# Patient Record
Sex: MALE | Race: WHITE | NOT HISPANIC OR LATINO | URBAN - METROPOLITAN AREA
[De-identification: names, ages, dates, MRNs, and addresses within clinical notes are randomized per-mention and may not be internally consistent; named-entity substitution may affect disease eponyms.]

---

## 2012-10-03 LAB
CREAT SERPL-MCNC: 2.74 MG/DL (ref 0.72–1.25)
GLUCOSE BLD-MCNC: 95 MG/DL (ref 65–100)

## 2012-11-20 LAB
CREAT SERPL-MCNC: 2.93 MG/DL (ref 0.72–1.25)
GLUCOSE BLD-MCNC: 108 MG/DL (ref 65–100)

## 2013-01-08 LAB
CREAT SERPL-MCNC: 2.72 MG/DL (ref 0.72–1.25)
GLUCOSE BLD-MCNC: 96 MG/DL (ref 65–100)

## 2013-03-27 LAB
CREAT SERPL-MCNC: 2.81 MG/DL (ref 0.72–1.25)
GLUCOSE BLD-MCNC: 95 MG/DL (ref 65–100)

## 2015-04-01 LAB
CREAT SERPL-MCNC: 3.51 MG/DL (ref 0.72–1.25)
GLUCOSE BLD-MCNC: 102 MG/DL (ref 65–100)

## 2017-03-30 ENCOUNTER — OFFICE VISIT - RIVER FALLS (OUTPATIENT)
Dept: FAMILY MEDICINE | Facility: CLINIC | Age: 67
End: 2017-03-30

## 2017-04-06 ENCOUNTER — OFFICE VISIT - RIVER FALLS (OUTPATIENT)
Dept: FAMILY MEDICINE | Facility: CLINIC | Age: 67
End: 2017-04-06

## 2017-05-17 ENCOUNTER — COMMUNICATION - RIVER FALLS (OUTPATIENT)
Dept: FAMILY MEDICINE | Facility: CLINIC | Age: 67
End: 2017-05-17

## 2017-05-17 ENCOUNTER — OFFICE VISIT - RIVER FALLS (OUTPATIENT)
Dept: FAMILY MEDICINE | Facility: CLINIC | Age: 67
End: 2017-05-17

## 2017-10-13 ENCOUNTER — OFFICE VISIT - RIVER FALLS (OUTPATIENT)
Dept: FAMILY MEDICINE | Facility: CLINIC | Age: 67
End: 2017-10-13

## 2017-10-16 LAB
CREAT SERPL-MCNC: 2.59 MG/DL (ref 0.7–1.25)
GLUCOSE BLD-MCNC: 94 MG/DL (ref 65–99)

## 2018-03-21 ENCOUNTER — AMBULATORY - RIVER FALLS (OUTPATIENT)
Dept: FAMILY MEDICINE | Facility: CLINIC | Age: 68
End: 2018-03-21

## 2018-03-23 ENCOUNTER — AMBULATORY - RIVER FALLS (OUTPATIENT)
Dept: FAMILY MEDICINE | Facility: CLINIC | Age: 68
End: 2018-03-23

## 2018-03-26 LAB
CHOLEST SERPL-MCNC: 155 MG/DL
CHOLEST/HDLC SERPL: 3.4 {RATIO}
CREAT SERPL-MCNC: 2.83 MG/DL (ref 0.7–1.25)
GLUCOSE BLD-MCNC: 89 MG/DL (ref 65–99)
HDLC SERPL-MCNC: 46 MG/DL
LDLC SERPL CALC-MCNC: 87 MG/DL
NONHDLC SERPL-MCNC: 109 MG/DL
TRIGL SERPL-MCNC: 123 MG/DL

## 2018-03-30 ENCOUNTER — OFFICE VISIT - RIVER FALLS (OUTPATIENT)
Dept: FAMILY MEDICINE | Facility: CLINIC | Age: 68
End: 2018-03-30

## 2018-08-09 ENCOUNTER — COMMUNICATION - RIVER FALLS (OUTPATIENT)
Dept: FAMILY MEDICINE | Facility: CLINIC | Age: 68
End: 2018-08-09

## 2018-08-09 ENCOUNTER — OFFICE VISIT - RIVER FALLS (OUTPATIENT)
Dept: FAMILY MEDICINE | Facility: CLINIC | Age: 68
End: 2018-08-09

## 2018-08-09 LAB
CREAT SERPL-MCNC: 2.99 MG/DL (ref 0.72–1.25)
GLUCOSE BLD-MCNC: 103 MG/DL (ref 65–100)

## 2018-12-14 ENCOUNTER — AMBULATORY - RIVER FALLS (OUTPATIENT)
Dept: FAMILY MEDICINE | Facility: CLINIC | Age: 68
End: 2018-12-14

## 2018-12-14 ENCOUNTER — OFFICE VISIT - RIVER FALLS (OUTPATIENT)
Dept: FAMILY MEDICINE | Facility: CLINIC | Age: 68
End: 2018-12-14

## 2018-12-17 LAB
CREAT SERPL-MCNC: 2.52 MG/DL (ref 0.7–1.25)
GLUCOSE BLD-MCNC: 88 MG/DL (ref 65–99)

## 2019-04-30 ENCOUNTER — OFFICE VISIT - RIVER FALLS (OUTPATIENT)
Dept: FAMILY MEDICINE | Facility: CLINIC | Age: 69
End: 2019-04-30

## 2019-04-30 ENCOUNTER — AMBULATORY - RIVER FALLS (OUTPATIENT)
Dept: FAMILY MEDICINE | Facility: CLINIC | Age: 69
End: 2019-04-30

## 2019-05-01 LAB
ALBUMIN SERPL-MCNC: 4.2 GM/DL (ref 3.6–5.1)
BUN SERPL-MCNC: 51 MG/DL (ref 7–25)
BUN/CREAT RATIO - HISTORICAL: 17 (ref 6–22)
CALCIUM SERPL-MCNC: 9.4 MG/DL (ref 8.6–10.3)
CALCIUM SERPL-MCNC: 9.4 MG/DL (ref 8.6–10.3)
CHLORIDE BLD-SCNC: 105 MMOL/L (ref 98–110)
CO2 SERPL-SCNC: 23 MMOL/L (ref 20–32)
CREAT SERPL-MCNC: 2.96 MG/DL (ref 0.7–1.25)
CREAT UR-MCNC: 57 MG/DL (ref 20–320)
EGFRCR SERPLBLD CKD-EPI 2021: 21 ML/MIN/1.73M2
GLUCOSE BLD-MCNC: 88 MG/DL (ref 65–99)
HGB BLD-MCNC: 12.5 GM/DL (ref 13.2–17.1)
PHOSPHATE SERPL-MCNC: 4.4 MG/DL (ref 2.1–4.3)
POTASSIUM BLD-SCNC: 4.2 MMOL/L (ref 3.5–5.3)
PROT UR-MCNC: 50 MG/DL (ref 5–25)
PROT/CREAT 24H UR: 877 MG/G{CREAT} (ref 22–128)
PTH-INTACT SERPL-MCNC: 120 PG/ML (ref 14–64)
SODIUM SERPL-SCNC: 140 MMOL/L (ref 135–146)

## 2019-05-02 ENCOUNTER — COMMUNICATION - RIVER FALLS (OUTPATIENT)
Dept: FAMILY MEDICINE | Facility: CLINIC | Age: 69
End: 2019-05-02

## 2019-11-19 ENCOUNTER — OFFICE VISIT - RIVER FALLS (OUTPATIENT)
Dept: FAMILY MEDICINE | Facility: CLINIC | Age: 69
End: 2019-11-19

## 2019-11-19 ENCOUNTER — AMBULATORY - RIVER FALLS (OUTPATIENT)
Dept: FAMILY MEDICINE | Facility: CLINIC | Age: 69
End: 2019-11-19

## 2019-11-20 LAB
ALBUMIN SERPL-MCNC: 4.5 GM/DL (ref 3.6–5.1)
BUN SERPL-MCNC: 51 MG/DL (ref 7–25)
BUN/CREAT RATIO - HISTORICAL: 16 (ref 6–22)
CALCIUM SERPL-MCNC: 9.6 MG/DL (ref 8.6–10.3)
CALCIUM SERPL-MCNC: 9.6 MG/DL (ref 8.6–10.3)
CHLORIDE BLD-SCNC: 102 MMOL/L (ref 98–110)
CO2 SERPL-SCNC: 24 MMOL/L (ref 20–32)
CREAT SERPL-MCNC: 3.25 MG/DL (ref 0.7–1.25)
CREAT UR-MCNC: 94 MG/DL (ref 20–320)
EGFRCR SERPLBLD CKD-EPI 2021: 18 ML/MIN/1.73M2
GLUCOSE BLD-MCNC: 95 MG/DL (ref 65–99)
HGB BLD-MCNC: 12.9 GM/DL (ref 13.2–17.1)
MICROALBUMIN UR-MCNC: 9.1 MG/DL
MICROALBUMIN/CREAT UR: 97 MG/G{CREAT}
PHOSPHATE SERPL-MCNC: 4.1 MG/DL (ref 2.1–4.3)
POTASSIUM BLD-SCNC: 3.8 MMOL/L (ref 3.5–5.3)
PTH-INTACT SERPL-MCNC: 133 PG/ML (ref 14–64)
SODIUM SERPL-SCNC: 139 MMOL/L (ref 135–146)

## 2019-11-21 ENCOUNTER — COMMUNICATION - RIVER FALLS (OUTPATIENT)
Dept: FAMILY MEDICINE | Facility: CLINIC | Age: 69
End: 2019-11-21

## 2020-01-01 ENCOUNTER — OFFICE VISIT - RIVER FALLS (OUTPATIENT)
Dept: FAMILY MEDICINE | Facility: CLINIC | Age: 70
End: 2020-01-01

## 2020-01-01 ENCOUNTER — COMMUNICATION - RIVER FALLS (OUTPATIENT)
Dept: FAMILY MEDICINE | Facility: CLINIC | Age: 70
End: 2020-01-01

## 2020-01-01 LAB
ALBUMIN SERPL-MCNC: 3.7 G/DL
ANION GAP SERPL CALCULATED.3IONS-SCNC: 9 MEQ/L
B-TYPE NATRIURETIC PEPTIDE: 235 PG/ML (ref 0–100)
BASOPHILS # BLD MANUAL: 0 CELLS/UL
BASOPHILS NFR BLD AUTO: 2.7 %
BUN SERPL-MCNC: 34 MG/DL
BUN SERPL-MCNC: 36 MG/DL
BUN SERPL-MCNC: 36 MG/DL
BUN SERPL-MCNC: 42 MG/DL
BUN/CREAT RATIO - HISTORICAL: 12
BUN/CREAT RATIO - HISTORICAL: 3.09
BUN/CREAT RATIO - HISTORICAL: 3.39
CALCIUM SERPL-MCNC: 7.8 MEQ/DL
CALCIUM SERPL-MCNC: 7.8 MEQ/DL
CALCIUM SERPL-MCNC: 8.2 MEQ/DL
CALCIUM SERPL-MCNC: 8.9 MEQ/DL
CHLORIDE BLD-SCNC: 107 MEQ/L
CHLORIDE BLD-SCNC: 117 MEQ/L
CHLORIDE BLD-SCNC: 117 MEQ/L
CHLORIDE BLD-SCNC: 118 MEQ/L
CO2 SERPL-SCNC: 19 MEQ/L
CO2 SERPL-SCNC: 23 MEQ/L
CREAT SERPL-MCNC: 12 MG/DL
CREAT SERPL-MCNC: 2.79 MG/DL
CREAT SERPL-MCNC: 3.09 MG/DL
EOSINOPHIL # BLD MANUAL: 0.2 CELLS/UL
EOSINOPHIL NFR BLD AUTO: 0.3 %
ERYTHROCYTE [DISTWIDTH] IN BLOOD BY AUTOMATED COUNT: 14.6 %
GFR ESTIMATE EXT - HISTORICAL: 20 ML/MIN
GLUCOSE BLD-MCNC: 103 MG/DL
GLUCOSE BLD-MCNC: 89 MG/DL
GLUCOSE BLD-MCNC: 90 MG/DL
GLUCOSE BLD-MCNC: 90 MG/DL
HCT VFR BLD AUTO: 28.4 %
HCT VFR BLD AUTO: 28.4 %
HGB BLD-MCNC: 9.5 G/DL
HGB BLD-MCNC: 9.5 G/DL
INR BLD: 1.9
INR BLD: 2.1
INR BLD: 2.2
INR PPP: 1.1
INR PPP: 1.1
INR PPP: 2.8
INR PPP: 3.2
INR PPP: 3.3
INR PPP: 7.6
LYMPHOCYTES # BLD MANUAL: 0.8 CELLS/UL
LYMPHOCYTES NFR BLD AUTO: 10 %
MCH RBC QN AUTO: 29.1 PG
MCHC RBC AUTO-ENTMCNC: 33.5 GM/DL
MCV RBC AUTO: 87 FL
MONOCYTES # BLD MANUAL: 0.9 CELLS/UL
MONOCYTES NFR BLD AUTO: 11.4 %
NEUTROPHILS # BLD MANUAL: 5.7 CELLS/UL
NEUTROPHILS NFR BLD AUTO: 75.6 %
PHOSPHATE SERPL-MCNC: 4.4 MG/DL
PLATELET # BLD AUTO: 134 X10
PLATELET # BLD AUTO: 134 X10
PMV BLD: 10 FL
POTASSIUM BLD-SCNC: 3 MEQ/L
POTASSIUM BLD-SCNC: 3 MEQ/L
POTASSIUM BLD-SCNC: 3.3 MEQ/L
POTASSIUM BLD-SCNC: 3.6 MEQ/L
PROTHROMBIN TIME: 13.9 S
PROTHROMBIN TIME: 13.9 S
RBC # BLD AUTO: 3.27 X10
RBC # BLD AUTO: 3.27 X10
SODIUM SERPL-SCNC: 140 MEQ/L
SODIUM SERPL-SCNC: 145 MEQ/L
SODIUM SERPL-SCNC: 145 MEQ/L
SODIUM SERPL-SCNC: 146 MEQ/L
TSH SERPL DL<=0.005 MIU/L-ACNC: 4.45 MIU/L
TSH SERPL DL<=0.005 MIU/L-ACNC: 4.45 MIU/L
WBC # BLD AUTO: 7.5 X10
WBC # BLD AUTO: 7.5 X10

## 2022-02-11 VITALS
DIASTOLIC BLOOD PRESSURE: 60 MMHG | WEIGHT: 167 LBS | HEART RATE: 63 BPM | OXYGEN SATURATION: 96 % | SYSTOLIC BLOOD PRESSURE: 120 MMHG | TEMPERATURE: 97.4 F

## 2022-02-12 VITALS
TEMPERATURE: 97.6 F | OXYGEN SATURATION: 97 % | SYSTOLIC BLOOD PRESSURE: 140 MMHG | DIASTOLIC BLOOD PRESSURE: 72 MMHG | HEART RATE: 67 BPM

## 2022-02-12 VITALS
HEART RATE: 61 BPM | DIASTOLIC BLOOD PRESSURE: 76 MMHG | SYSTOLIC BLOOD PRESSURE: 130 MMHG | OXYGEN SATURATION: 99 % | TEMPERATURE: 97.1 F

## 2022-02-12 VITALS — DIASTOLIC BLOOD PRESSURE: 88 MMHG | HEART RATE: 59 BPM | TEMPERATURE: 97.1 F | SYSTOLIC BLOOD PRESSURE: 153 MMHG

## 2022-02-12 VITALS
WEIGHT: 196.8 LBS | SYSTOLIC BLOOD PRESSURE: 150 MMHG | DIASTOLIC BLOOD PRESSURE: 83 MMHG | HEART RATE: 71 BPM | TEMPERATURE: 97 F

## 2022-02-12 VITALS
WEIGHT: 183 LBS | TEMPERATURE: 96.9 F | SYSTOLIC BLOOD PRESSURE: 153 MMHG | HEART RATE: 67 BPM | DIASTOLIC BLOOD PRESSURE: 88 MMHG

## 2022-02-12 VITALS — HEART RATE: 72 BPM | TEMPERATURE: 96.2 F | SYSTOLIC BLOOD PRESSURE: 122 MMHG | DIASTOLIC BLOOD PRESSURE: 76 MMHG

## 2022-02-12 VITALS
TEMPERATURE: 97.3 F | HEART RATE: 61 BPM | WEIGHT: 196 LBS | SYSTOLIC BLOOD PRESSURE: 137 MMHG | DIASTOLIC BLOOD PRESSURE: 83 MMHG | OXYGEN SATURATION: 97 %

## 2022-02-15 NOTE — NURSING NOTE
Anticoagulation Therapy Management Entered On:  8/14/2020 3:02 PM CDT    Performed On:  8/14/2020 3:01 PM CDT by Chantal Zarate RN               Anticoagulation Visit Assessment   Anticoagulation Indication :   Atrial fibrillation   Anticoagulation Medication Verified :   Yes   Chantal Zarate RN - 8/14/2020 3:01 PM CDT   Anticoagulation Patient Assessment Grid   Change in Alcohol Consumption :   No   Change in Diet :   No   Change in Medications :   No   Diarrhea :   No   Rectal Bleeding :   No   Signs of Clotting :   No   Signs of Warfarin Intolerance :   No   Unusual Bleeding, Bruising :   No   Upcoming Procedures :   No   Vomiting :   No   Chantal Zarate RN - 8/14/2020 3:01 PM CDT   Patient on Warfarin :   Yes   Chantal Zarate RN - 8/14/2020 3:01 PM CDT   Warfarin   Anticoagulant INR Goal Lower :   2    Anticoagulant INR Goal Upper :   3    INR Home Monitoring Result :   2.5    Sunday :   0 mg   Monday :   0 mg   Tuesday :   2.5 mg   Wednesday :   2.5 mg   Thursday :   2.5 mg   Friday :   2.5 mg   Saturday :   2.5 mg   Total Dose :   12.5 mg   Warfarin Pt Reported Previous Week Dose :    Sun Mon Tues Wed Thurs Fri Sat Weekly Total Dose   Week 1                   Week 2                   Week 3                   Week 4                         Patient is taking single or multiple strength tablet(s) :   Single strength tab(s)   One Tab Strength :   5 mg tab   Sunday :   2.5 mg   Monday :   0 mg   Tuesday :   0 mg   Wednesday :   0 mg   Thursday :   0 mg   Friday :   0 mg   Saturday :   0 mg   Week 1 Total Dose :   2.5 mg   Sunday :   0.5 tab(s)   Monday :   0 tab(s)   Tuesday :   0 tab(s)   Wednesday :   0 tab(s)   Thursday :   0 tab(s)   Friday :   0 tab(s)   Saturday :   0 tab(s)   Patient Instructions :   Fax received from [facility] with INR = 2.5; per protocol continue warfarin 2.5 mg daily; recheck INR 8/17/2020. Faxed to [facility].     Chantal Zarate RN - 8/14/2020 3:01 PM CDT

## 2022-02-15 NOTE — NURSING NOTE
Anticoagulation Therapy Management Entered On:  12/30/2020 8:34 AM CST    Performed On:  12/30/2020 8:28 AM CST by Geri Valdez RN               Anticoagulation Visit Assessment   Contributing Factors :   Other:  INR 2.4 on 12/29/20 per [facility]. Covid hospitalization Discharge 12/28/20 [facility]. Holding warfarin until INR less than 2 then to start lovenox per discharge [facility]   Geri Valdez RN - 12/30/2020 8:34 AM CST   Anticoagulation Indication :   Atrial fibrillation   Anticoagulant Duration :   Undetermined   Geri Valdez RN - 12/30/2020 8:28 AM CST   Geri Valdez RN - 12/30/2020 8:28 AM CST   Anticoagulation Patient Assessment Grid   Change in Alcohol Consumption :   No   Geri Valdez RN - 12/30/2020 8:34 AM CST     Change in Diet :   No   Change in Medications :   Yes   (Comment: [facility] discharge holding warfarin multiple medications discontinued, hospice referral,, [Geri Valdez RN - 12/30/2020 8:28 AM CST] )   Diarrhea :   No   Rectal Bleeding :   No   Signs of Clotting :   No   Signs of Warfarin Intolerance :   No   Unusual Bleeding, Bruising :   No   Upcoming Procedures :   No   Vomiting :   No   Patient on Other Anticoagulant :   No   Geri Valdez RN - 12/30/2020 8:28 AM CST   Medication History   Medication List   (As Of: 12/30/2020 8:34:01 AM CST)   Prescription/Discharge Order    amLODIPine  :   amLODIPine ; Status:   Prescribed ; Ordered As Mnemonic:   amLODIPine 10 mg oral tablet ; Simple Display Line:   10 mg, 1 tab(s), po, daily, 90 tab(s), 3 Refill(s) ; Ordering Provider:   Chuck Anderson MD; Catalog Code:   amLODIPine ; Order Dt/Tm:   11/19/2019 5:39:48 PM CST          furosemide  :   furosemide ; Status:   Prescribed ; Ordered As Mnemonic:   furosemide 40 mg oral tablet ; Simple Display Line:   40 mg, 1 tab(s), Oral, daily, 90 tab(s), 3 Refill(s) ; Ordering Provider:   Chuck Anderson MD; Catalog Code:   furosemide ; Order Dt/Tm:   11/19/2019 5:39:47 PM CST          hydrALAZINE   :   hydrALAZINE ; Status:   Prescribed ; Ordered As Mnemonic:   hydrALAZINE 100 mg oral tablet ; Simple Display Line:   100 mg, 1 tab(s), PO, BID, 270 tab(s), 3 Refill(s) ; Ordering Provider:   Chuck Anderson MD; Catalog Code:   hydrALAZINE ; Order Dt/Tm:   11/19/2019 5:39:48 PM CST          melatonin  :   melatonin ; Status:   Prescribed ; Ordered As Mnemonic:   melatonin 3 mg oral tablet ; Simple Display Line:   3 mg, 1 tab(s), po, qhs, 90 tab(s), 3 Refill(s) ; Ordering Provider:   Chuck Anderson MD; Catalog Code:   melatonin ; Order Dt/Tm:   12/18/2018 12:30:54 PM CST          metoprolol  :   metoprolol ; Status:   Prescribed ; Ordered As Mnemonic:   Metoprolol Succinate  mg oral tablet, extended release ; Simple Display Line:   1 tab(s), Oral, daily, 90 tab(s), 3 Refill(s) ; Ordering Provider:   Chuck Anderson MD; Catalog Code:   metoprolol ; Order Dt/Tm:   11/19/2019 5:39:51 PM CST          Miscellaneous Rx Supply  :   Miscellaneous Rx Supply ; Status:   Prescribed ; Ordered As Mnemonic:   left foot and ankle orthosis ; Simple Display Line:   See Instructions, use as directed, 1 EA ; Ordering Provider:   Salomon Vance MD; Catalog Code:   Miscellaneous Rx Supply ; Order Dt/Tm:   7/24/2013 10:29:40 AM CDT          nitroglycerin  :   nitroglycerin ; Status:   Prescribed ; Ordered As Mnemonic:   nitroglycerin 0.4 mg sublingual tablet ; Simple Display Line:   0.4 mg, 1 tab(s), SL, q5min, PRN: for chest pain, 25 tab(s), 1 Refill(s) ; Ordering Provider:   Chuck Anderson MD; Catalog Code:   nitroglycerin ; Order Dt/Tm:   9/29/2016 7:25:36 PM CDT          tamsulosin  :   tamsulosin ; Status:   Prescribed ; Ordered As Mnemonic:   tamsulosin 0.4 mg oral capsule ; Simple Display Line:   0.4 mg, 1 cap(s), PO, Daily, 90 cap(s), 3 Refill(s) ; Ordering Provider:   Chuck Anderson MD; Catalog Code:   tamsulosin ; Order Dt/Tm:   11/19/2019 5:39:49 PM CST            Home Meds    aspirin  :   aspirin ; Status:   Documented ; Ordered As  Mnemonic:   aspirin 81 mg oral tablet, chewable ; Simple Display Line:   81 mg, 1 tab(s), Oral, daily ; Catalog Code:   aspirin ; Order Dt/Tm:   7/27/2020 9:04:22 AM CDT          magnesium hydroxide  :   magnesium hydroxide ; Status:   Documented ; Ordered As Mnemonic:   Milk of Magnesia ; Simple Display Line:   Oral, hs, PRN: as needed for constipation, 0 Refill(s) ; Catalog Code:   magnesium hydroxide ; Order Dt/Tm:   12/9/2020 11:05:24 AM CST          polyethylene glycol 3350  :   polyethylene glycol 3350 ; Status:   Documented ; Ordered As Mnemonic:   MiraLax oral powder for reconstitution ; Simple Display Line:   17 gm, Oral, daily, PRN: for constipation, 0 Refill(s) ; Catalog Code:   polyethylene glycol 3350 ; Order Dt/Tm:   12/9/2020 11:05:15 AM CST          rosuvastatin  :   rosuvastatin ; Status:   Documented ; Ordered As Mnemonic:   Crestor 5 mg oral tablet ; Simple Display Line:   5 mg, 1 tab(s), Oral, daily, 0 Refill(s) ; Catalog Code:   rosuvastatin ; Order Dt/Tm:   12/9/2020 10:59:16 AM CST          senna  :   senna ; Status:   Documented ; Ordered As Mnemonic:   Senna ; Simple Display Line:   po, hs, PRN: as needed for constipation ; Catalog Code:   senna ; Order Dt/Tm:   10/2/2012 11:15:43 AM CDT          warfarin  :   warfarin ; Status:   Documented ; Ordered As Mnemonic:   warfarin 7.5 mg oral tablet ; Simple Display Line:   See Instructions, alternating 7.5, 5mg QOD ; Catalog Code:   warfarin ; Order Dt/Tm:   7/27/2020 9:04:40 AM CDT

## 2022-02-15 NOTE — NURSING NOTE
Anticoagulation Therapy Management Entered On:  8/21/2020 4:23 PM CDT    Performed On:  8/21/2020 4:17 PM CDT by Geri Valdez RN               Anticoagulation Visit Assessment   Anticoagulation Indication :   Atrial fibrillation   Anticoagulant Duration :   Undetermined   Geri Valdez RN - 8/21/2020 4:17 PM CDT   Anticoagulation Patient Assessment Grid   Change in Alcohol Consumption :   No   Change in Diet :   No   Change in Medications :   No   Diarrhea :   No   Rectal Bleeding :   No   Signs of Clotting :   No   Signs of Warfarin Intolerance :   No   Unusual Bleeding, Bruising :   No   Upcoming Procedures :   No   Vomiting :   No   Geri Valdez RN - 8/21/2020 4:17 PM CDT   Patient on Warfarin :   Yes   Geri Valdez RN - 8/21/2020 4:17 PM CDT   Warfarin   Anticoagulant INR Goal Lower :   2    Anticoagulant INR Goal Upper :   3    Warfarin Pt Reported Previous Week Dose :    Sun Mon Tues Wed Thurs Fri Sat Weekly Total Dose   Week 1                   Week 2                   Week 3                   Week 4                         Patient is taking single or multiple strength tablet(s) :   Single strength tab(s)   One Tab Strength :   5 mg tab   Sunday :   0 mg   Monday :   0 mg   Tuesday :   0 mg   Wednesday :   0 mg   Thursday :   0 mg   Friday :   0 mg   Saturday :   0 mg   Week 1 Total Dose :   0 mg   Sunday :   0 tab(s)   Monday :   0 tab(s)   Tuesday :   0 tab(s)   Wednesday :   0 tab(s)   Thursday :   0 tab(s)   Friday :   0 tab(s)   Saturday :   0 tab(s)   Patient Instructions :   INR = 5.0 per [facility] fax today. Patient is to hold warfarin and recheck INR on 8/24/20 directions faxed to Geri Granados RN - 8/21/2020 4:17 PM CDT

## 2022-02-15 NOTE — PROGRESS NOTES
Patient:   BEHRENS, LARRY L            MRN: [id number]            FIN: [id number]               Age:   67 years     Sex:  Male     :  1950   Associated Diagnoses:   Cerebrovascular Accident (Stroke); HTN (Hypertension); Chronic Kidney Disease (CKD), Stage IV (Severe)   Author:   Chuck Anderson MD      Visit Information      Date of Service: 10/13/2017 03:42 pm  Performing Location: [facility]  Encounter#: [id number]      Primary Care Provider (PCP):  Chuck Anderson MD    NPI# [id number]      Referring Provider:  Chuck Anderson MD, NPI# [id number]   Accompanied by:  Spouse.    Source of history:  Self, Spouse.       Chief Complaint            Additional Information:No additional information recorded during visit.   Chief complaint and symptoms as noted above and confirmed with patient      History of Present Illness   10/14/2013: Mr. Behrens is a 64 yo referred by Dr. Vance  to renal clinic for evaluation of CKD, stage IV.  He has previously beened evaluated by Dr. Dockery via [facility] nephrology group, last seen in .  He has previously undergone extensive renal work-up including renal U/S showing no evidence of macrovascular disease and extensive intraparenchymal disease.  Previous urine immunofization studies negative.  Serological work-up previously negative.  His most recent SCr was 3.3 in August, eGFR ~18mL/min.  He and his wife say they've had some discussion with previous nephrologist about renal replacement therapy, though still unclear about what option is best for them.  Denies any uremic symptoms.  His peripheral edema has been well controlled on metolazone as diuretic monotherapy.  Blood pressure well controlled.  Currently without insurance and working on both coverage options.     10/13/2017: Returns to clinic for f/u of CKD.  Labs reviewed.  No voiced complaints.  No active LUTs.  No bone pains.Presents with 4 day history of unilateral left-sided lower extremity swelling and some  noticed purpuric features.  His wife is concerned about him having cellulitis.  The leg does not hurt.  No recent injury.  No history of venous thromboembolism.  Remote history of stroke with left-sided hemiparesis.         Review of Systems   Constitutional:  No fever, No chills.    Eye   Ear/Nose/Mouth/Throat:  No nasal congestion.    Respiratory:  No shortness of breath, No cough.    Cardiovascular:  No palpitations, No peripheral edema, No syncope.    Gastrointestinal:  No nausea, No vomiting, No abdominal pain.    Genitourinary:  No dysuria, No hematuria.    Hematology/Lymphatics:  Negative except as documented in history of present illness.    Endocrine:  No excessive thirst, No polyuria.    Immunologic:  No recurrent fevers.    Musculoskeletal:  No joint pain, No muscle pain.    Neurologic:  Alert and oriented X4, No numbness, No tingling, No headache.       Health Status   Allergies:    Allergic Reactions (Selected)  Severity Not Documented  Penicillin (No reactions were documented)   Medications:  (Selected)   Prescriptions  Prescribed  Lasix 40 mg oral tablet: 1 tab(s) ( 40 mg ), PO, Daily, # 90 tab(s), 3 Refill(s), Type: Maintenance, Pharmacy: [facility], 1 tab(s) po daily  amLODIPine 10 mg oral tablet: 1 tab(s) ( 10 mg ), PO, Daily, # 90 tab(s), 3 Refill(s), Type: Maintenance, Pharmacy: [facility], 1 tab(s) po daily  hydrALAZINE 50 mg oral tablet: 1 tab(s) ( 50 mg ), po, tid, # 270 tab(s), 3 Refill(s), Type: Maintenance, Pharmacy: [facility], 1 tab(s) po tid  left foot and ankle orthosis: left foot and ankle orthosis, See Instructions, Instructions: use as directed, # 1 EA, 0 Refill(s), Type: Maintenance  melatonin 3 mg oral tablet: 1 tab(s) ( 3 mg ), po, qhs, # 90 tab(s), 3 Refill(s), Type: Maintenance, Pharmacy: [facility], 1 tab(s) po qhs  metoprolol succinate 100 mg oral tablet, extended release: 1 tab(s) ( 100 mg ), PO, Daily, # 90 tab(s), 3 Refill(s), Type: Maintenance,  Pharmacy: [facility], 1 tab(s) po daily  nitroglycerin 0.4 mg sublingual tablet: 1 tab(s) ( 0.4 mg ), SL, q5min, PRN: for chest pain, # 25 tab(s), 1 Refill(s), Type: Maintenance, Pharmacy: [facility], KEEP ON FILE AND PT WILL NOTFY WHEN NEEDED, 1 tab(s) sl q5 min,PRN:for chest pain  simvastatin 20 mg oral tablet: 1 tab(s) ( 20 mg ), po, hs, # 90 tab(s), 3 Refill(s), Type: Maintenance, Pharmacy: [facility], 1 tab(s) po hs  tamsulosin 0.4 mg oral capsule: 1 cap(s) ( 0.4 mg ), PO, Daily, # 90 cap(s), 3 Refill(s), Type: Maintenance, Pharmacy: [facility], 1 cap(s) po daily  Documented Medications  Documented  Senna: po, hs, PRN: as needed for constipation, 0 Refill(s), Type: Maintenance  aspirin 325 mg oral tablet: 1 tab(s) ( 325 mg ), po, daily, 0 Refill(s), Type: Maintenance   Problem list:    All Problems  CKD (chronic kidney disease) stage 4, GFR 15-29 ml/min / SNOMED CT [id number] / Confirmed  Cerebrovascular accident (stroke) / SNOMED CT 092500715 / Confirmed  History of tobacco use / ICD-9-CM V15.82 / Probable  Hypertensive kidney disease with chronic kidney disease stage IV / SNOMED CT 73429074 / Confirmed  Left hemiplegia / SNOMED CT 724072595 / Confirmed  Resolved: *Hospitalized@[facility] - Chest pains  Resolved: *Hospitalized@[facility] - Weakness, generalized  Resolved: Acute on chronic renal failure / SNOMED CT 6732112045  Resolved: No pertinent past medical history / ICD-9-CM V49.9      Histories   Past Medical History:    Active  Hypertensive kidney disease with chronic kidney disease stage IV (30485822)  Cerebrovascular accident (stroke) (824135918)  Left hemiplegia (352482226)  Resolved  *Hospitalized@[facility] - Chest pains: Onset on 4/6/2014 at 64 years.  Resolved on 4/7/2014 at 64 years.  *Hospitalized@[facility] - Weakness, generalized: Onset on 9/25/2012 at 62 years.  Resolved.  No pertinent past medical history (V49.9):  Resolved.  Comments:  9/25/2012 CDT 11:55 AM CDT -  Barry Vuong MD  no regular health care  Acute on chronic renal failure (6699055877):  Resolved.   Family History:    Cancer  Mother  Father     Procedure history:    Appendectomy (208237518).  History of elbow surgery ([id number]).   Social History:        Alcohol Assessment: High Risk            Past      Tobacco Assessment            Past, Cigarettes, 10 per day.  30 year(s).  Total pack years: 50.      Substance Abuse Assessment: Denies Substance Abuse      Employment and Education Assessment            Retired      Home and Environment Assessment            Marital status: .  Spouse/Partner name: Sidra.  0 children.                     Comments:                      09/25/2012 - Barry Vuong MD                     no insurance      Exercise and Physical Activity Assessment            Exercise frequency: 5 times per week.  Exercise type: stationary bike.        Physical Examination   vital signs stable, as noted above   VS/Measurements   General:  Alert and oriented, No acute distress.    Eye:  Extraocular movements are intact.    HENT:  Normocephalic, Tympanic membranes are clear, Oral mucosa is moist.    Neck:  Supple.    Respiratory:  Lungs are clear to auscultation, Respirations are non-labored.    Cardiovascular:  Normal rate, Regular rhythm, No murmur.    Gastrointestinal:  Soft, Non-distended, Normal bowel sounds, No organomegaly.    Genitourinary:  No costovertebral angle tenderness.    Lymphatics:  unilateral left sided edema up to knee; some purpuric/ecchymotic features along posterior/lateral aspect of leg - no cellulitic features.  Non tender; no warmth.    Musculoskeletal:  Normal range of motion, Normal strength, No tenderness, left sided hemiparesis with contractures.    Neurologic:  Alert, Oriented, Normal motor function, No focal deficits.    Cognition and Speech:  Oriented, word apraxia, some dysarthric speech.    Psychiatric:  Appropriate mood & affect.        Review / Management   Results review      Impression and Plan   Diagnosis     Cerebrovascular Accident (Stroke) (NZU94-GK I63.50).     HTN (Hypertension) (QYS46-YP I10).     Chronic Kidney Disease (CKD), Stage IV (Severe) (LGM90-GA N18.4).         .) CKD, stage IV - likely related to microvascular disease in setting of long-standing hypertension, tobaco use.      - baseline SCr of ~2.5-3.0; eGFR ~20mL/min, stable    - CO2 fine    - proteinuria slowly rising     - consider ACEi vs. ARB in the future if proteinuria >0.5g/g    - attended pre-dialysis education classes via [facility] (6/2014). Tentative plans to begin CCPD when time to initiate comes    .) unilateral left sided edema; no cellulitic features on exam   - suspect neurogenic related edema; potential DVT?   - make arrangements for venous duplex study   - assuming no DVT; I think we would benefit from home lymphedema wrapping    - Juan is homebound given related hemiparesis, immobility, and general frailty.  He requires assistance of both wife and another individual to transfer him from car.  I believe he would benefit from home PT/OT assessment    .) Hypertension, controlled   - currently near goal of SBP <140   - continue Toprol XL 100mg daily, amlodipine 10mg, doxazosin 2mg qhs, hydralazine 50mg TID, lasix 40mg daily   .) secondary hyperparathyroidism/ hyperphosphatemia    - phosphorus levels normal without binder therapy    - vitamin D stores replete    - not an active candidate for vitamin D analogues    .) Anemia of chronic renal disease   - Hgb 14.1   - currently not on ESAs   - follow serial Hgb and iron studies as appropriate    .) CVA   - worked with PT/OT   - discontinued baclofen because of sedation   - working with neuro in [place]    .) health maintenance   - declines all forms of age appropriate health screening   - PSA elevated at 8.1 as part of prostatitis work-up; Juan does not want to pursue this any further    - no targeted symptoms  to suggestive advanced spread of disease    RTC q 4 months

## 2022-02-15 NOTE — TELEPHONE ENCOUNTER
---------------------  From: Heidi Fagan RN (INR Pool ( [address]))   To: [facility] Message Pool ([facility]);     Sent: 12/29/2020 2:56:27 PM CST  Subject: INR     Received fax from [facility] with INR result of 2.4 for patient.   Warfarin discontinued and Lovenox to be started once INR < 2.0.    Is Warfarin going to be restarted or is it discontinued indefinitely?  Please advise.---------------------  From: Larissa Rae CMA ([facility] Message Pool ([facility]))   To: Chuck Anderson MD;     Sent: 12/29/2020 2:59:12 PM CST  Subject: FW: INR---------------------  From: Chuck Anderson MD   To: [facility] Message Pool ([facility]); INR Pool ( [address] [place] [address]);     Sent: 12/29/2020 4:02:19 PM CST  Subject: RE: INR     warfarin is being held indefinitely for now.  I want him to start on enoxaparin 40mg SQ daily once INR <2.0.  Not therapeutic dosing---------------------  From: Heidi Fagan RN (INR Pool ( [address]))   To: [facility] Message Pool ([facility]);     Sent: 12/29/2020 4:24:40 PM CST  Subject: RE: INR     Recheck INR 12/31/20?---------------------  From: Larissa Rae CMA ([facility] Message Pool ([address]))   To: INR Pool ( [address] [place] [address]);     Sent: 12/30/2020 7:23:03 AM CST  Subject: RE: INR     Per INR flow sheet repeat INR Thurs - see fax---------------------  From: Shirley Orozco (INR Pool ( [address]))   To: Chuck Anderson MD;     Sent: 1/8/2021 8:30:21 AM CST  Subject: FW: INR     FYI: Pt never returned for recheck of INR    Shirley RN---------------------  From: Chuck Anderson MD   To: [facility] Message Pool ([address]_[place] [facility] [place]);     Sent: 1/8/2021 1:57:13 PM CST  Subject: FW: INR     Can you plan on Juan following up in clinic. Or I should talk more to Sidra about future goals of care and whether he want to be pursuing more palliative care goals.---------------------  From: Larissa Rae CMA ([facility] Message Pool ([address]_[facility]  [place]))   To: Chuck Anderson MD;     Sent: 1/12/2021 9:34:40 AM CST  Subject: RE: INR     Do you want to talk to Sidra leary?---------------------  From: Chuck Anderson MD   To: [facility] Message Pool ([address]_[place] [facility] [place]);     Sent: 1/12/2021 11:54:27 AM CST  Subject: RE: INR     yes, I think that's best.  I'll call her---------------------  From: Chuck Anderson MD   To: [facility] Message Pool ([address]_[facility]); INR Pool ( [address]);     Sent: 1/12/2021 3:22:42 PM CST  Subject: RE: INR     I spoke with Sidra.  Juan's on hospice cares with [facility] through [facility].  No needs for further anticoagulation follow-up---------------------  From: Larissa Rae CMA ([facility] Message Pool ([address]_[facility]))   To: INR Pool ( [address]_[facility]);     Sent: 1/12/2021 3:26:36 PM CST  Subject: FW: INRNoted. Patient deactivated in [employer] program.

## 2022-02-15 NOTE — NURSING NOTE
Anticoagulation Therapy Management Entered On:  8/27/2020 2:30 PM CDT    Performed On:  8/27/2020 2:25 PM CDT by Geri Valdez RN               Anticoagulation Visit Assessment   Anticoagulation Indication :   Atrial fibrillation   Anticoagulant Duration :   Undetermined   Anticoagulation Medication Verified :   Yes   Geri Valdez RN - 8/27/2020 2:25 PM CDT   Anticoagulation Patient Assessment Grid   Change in Alcohol Consumption :   No   Change in Diet :   No   Change in Medications :   No   Diarrhea :   No   Rectal Bleeding :   No   Signs of Clotting :   No   Signs of Warfarin Intolerance :   No   Unusual Bleeding, Bruising :   No   Upcoming Procedures :   No   Vomiting :   No   Geri Valdez RN - 8/27/2020 2:25 PM CDT   Patient on Warfarin :   Yes   Patient on Other Anticoagulant :   No   Geri Valdez RN - 8/27/2020 2:25 PM CDT   Warfarin   International Normalization Ratio TR :   3.2    Anticoagulant INR Goal Lower :   2    Anticoagulant INR Goal Upper :   3    Sunday :   0 mg   Monday :   1.25 mg   Tuesday :   1.25 mg   Wednesday :   1.25 mg   Total Dose :   3.75 mg   Warfarin Pt Reported Previous Week Dose :    Sun Mon Tues Wed Thurs Fri Sat Weekly Total Dose   Week 1                   Week 2                   Week 3                   Week 4                         Patient is taking single or multiple strength tablet(s) :   Single strength tab(s)   One Tab Strength :   5 mg tab   Sunday :   1.25 mg   Monday :   1.25 mg   Tuesday :   1.25 mg   Wednesday :   1.25 mg   Thursday :   1.25 mg   Friday :   1.25 mg   Saturday :   1.25 mg   Week 1 Total Dose :   8.75 mg   Sunday :   0.25 tab(s)   Monday :   0.25 tab(s)   Tuesday :   0.25 tab(s)   Wednesday :   0.25 tab(s)   Thursday :   0.25 tab(s)   Friday :   0.25 tab(s)   Saturday :   0.25 tab(s)   Patient Instructions :   INR = 3.2 per [facility] today via fax. Patient is to hold warfarin today then take 1.25mg daily and recheck INR on 8/31/20 per  protocol. Directions faxed to [facility].      Geri Valdez RN - 8/27/2020 2:25 PM CDT   Medication History   Medication List   (As Of: 8/27/2020 2:30:07 PM CDT)   Prescription/Discharge Order    tamsulosin  :   tamsulosin ; Status:   Prescribed ; Ordered As Mnemonic:   tamsulosin 0.4 mg oral capsule ; Simple Display Line:   0.4 mg, 1 cap(s), PO, Daily, 90 cap(s), 3 Refill(s) ; Ordering Provider:   Chuck Anderson MD; Catalog Code:   tamsulosin ; Order Dt/Tm:   11/19/2019 5:39:49 PM CST          amLODIPine  :   amLODIPine ; Status:   Prescribed ; Ordered As Mnemonic:   amLODIPine 10 mg oral tablet ; Simple Display Line:   10 mg, 1 tab(s), po, daily, 90 tab(s), 3 Refill(s) ; Ordering Provider:   Chuck Anderson MD; Catalog Code:   amLODIPine ; Order Dt/Tm:   11/19/2019 5:39:48 PM CST          hydrALAZINE  :   hydrALAZINE ; Status:   Prescribed ; Ordered As Mnemonic:   hydrALAZINE 100 mg oral tablet ; Simple Display Line:   100 mg, 1 tab(s), PO, BID, 270 tab(s), 3 Refill(s) ; Ordering Provider:   Chuck Anderson MD; Catalog Code:   hydrALAZINE ; Order Dt/Tm:   11/19/2019 5:39:48 PM CST          simvastatin  :   simvastatin ; Status:   Prescribed ; Ordered As Mnemonic:   simvastatin 20 mg oral tablet ; Simple Display Line:   20 mg, 1 tab(s), Oral, hs, 90 tab(s), 3 Refill(s) ; Ordering Provider:   Chuck Anderson MD; Catalog Code:   simvastatin ; Order Dt/Tm:   11/19/2019 5:39:52 PM CST          melatonin  :   melatonin ; Status:   Prescribed ; Ordered As Mnemonic:   melatonin 3 mg oral tablet ; Simple Display Line:   3 mg, 1 tab(s), po, qhs, 90 tab(s), 3 Refill(s) ; Ordering Provider:   Chuck Anderson MD; Catalog Code:   melatonin ; Order Dt/Tm:   12/18/2018 12:30:54 PM CST          furosemide  :   furosemide ; Status:   Prescribed ; Ordered As Mnemonic:   furosemide 40 mg oral tablet ; Simple Display Line:   40 mg, 1 tab(s), Oral, daily, 90 tab(s), 3 Refill(s) ; Ordering Provider:   Chuck Anderson MD; Catalog Code:   furosemide ; Order  Dt/Tm:   11/19/2019 5:39:47 PM CST          metoprolol  :   metoprolol ; Status:   Prescribed ; Ordered As Mnemonic:   Metoprolol Succinate  mg oral tablet, extended release ; Simple Display Line:   1 tab(s), Oral, daily, 90 tab(s), 3 Refill(s) ; Ordering Provider:   Chuck Anderson MD; Catalog Code:   metoprolol ; Order Dt/Tm:   11/19/2019 5:39:51 PM CST          nitroglycerin  :   nitroglycerin ; Status:   Prescribed ; Ordered As Mnemonic:   nitroglycerin 0.4 mg sublingual tablet ; Simple Display Line:   0.4 mg, 1 tab(s), SL, q5min, PRN: for chest pain, 25 tab(s), 1 Refill(s) ; Ordering Provider:   Chuck Anderson MD; Catalog Code:   nitroglycerin ; Order Dt/Tm:   9/29/2016 7:25:36 PM CDT          Miscellaneous Rx Supply  :   Miscellaneous Rx Supply ; Status:   Prescribed ; Ordered As Mnemonic:   left foot and ankle orthosis ; Simple Display Line:   See Instructions, use as directed, 1 EA ; Ordering Provider:   Salomon Vance MD; Catalog Code:   Miscellaneous Rx Supply ; Order Dt/Tm:   7/24/2013 10:29:40 AM CDT            Home Meds    aspirin  :   aspirin ; Status:   Documented ; Ordered As Mnemonic:   aspirin 81 mg oral tablet, chewable ; Simple Display Line:   81 mg, 1 tab(s), Oral, daily ; Catalog Code:   aspirin ; Order Dt/Tm:   7/27/2020 9:04:22 AM CDT          senna  :   senna ; Status:   Documented ; Ordered As Mnemonic:   Senna ; Simple Display Line:   po, hs, PRN: as needed for constipation ; Catalog Code:   senna ; Order Dt/Tm:   10/2/2012 11:15:43 AM CDT          warfarin  :   warfarin ; Status:   Documented ; Ordered As Mnemonic:   warfarin 7.5 mg oral tablet ; Simple Display Line:   See Instructions, alternating 7.5, 5mg QOD ; Catalog Code:   warfarin ; Order Dt/Tm:   7/27/2020 9:04:40 AM CDT

## 2022-02-15 NOTE — PROGRESS NOTES
Patient:   BEHRENS, LARRY L            MRN: [id number]            FIN: [id number]               Age:   69 years     Sex:  Male     :  1950   Associated Diagnoses:   Cerebrovascular Accident (Stroke); HTN (Hypertension); CKD (chronic kidney disease) stage 4, GFR 15-29 ml/min   Author:   Chuck Anderson MD      Visit Information      Date of Service: 2019 02:20 pm  Performing Location: [facility]  Encounter#: [id number]      Primary Care Provider (PCP):  Chuck Anderson MD    NPI# [id number]      Referring Provider:  Chuck Anderson MD, NPI# [id number]   Accompanied by:  Spouse.    Source of history:  Self, Spouse.       Chief Complaint   2019 2:34 PM CST   f/u CKD            Additional Information:No additional information recorded during visit.   Chief complaint and symptoms as noted above and confirmed with patient      History of Present Illness   10/14/2013: Mr. Behrens is a 64 yo referred by Dr. Vance  to renal clinic for evaluation of CKD, stage IV.  He has previously beened evaluated by Dr. Dockery via [facility] nephrology group, last seen in .  He has previously undergone extensive renal work-up including renal U/S showing no evidence of macrovascular disease and extensive intraparenchymal disease.  Previous urine immunofization studies negative.  Serological work-up previously negative.  His most recent SCr was 3.3 in August, eGFR ~18mL/min.  He and his wife say they've had some discussion with previous nephrologist about renal replacement therapy, though still unclear about what option is best for them.  Denies any uremic symptoms.  His peripheral edema has been well controlled on metolazone as diuretic monotherapy.  Blood pressure well controlled.  Currently without insurance and working on both coverage options.     2018: Juan returns to clinic for follow-up related to his chronic kidney disease.  Overall has been doing well.  No significant change in health since her  last visit.  Tolerating blood pressure medicines without issues.  Previous complaints of fatigue earlier in the fall time have resolved.  He feels at his baseline state.    4/30/2019: Juan presents for follow-up related to his chronic kidney disease.  He has had a fairly uneventful winter.  No interval events since last visit.  Tolerating medications without issues.  No labs available before visit due to hardship of leaving the home.  No urinary tract symptoms.  Unclear on weight trends.  Weights approximately 10 pounds below historical weight though unclear unreliability of in clinic weights to his hemiparesis and balance issues.    11/19/2019: Juan returns for follow-up related to his chronic kidney disease.  Overall doing okay.  Kailey has noticed increased coughing though nonproductive.  Juan does not seem to be overly bothered by it.  No complaints of shortness of breath.  Denies any reflux symptoms.  No change or worsening of urinary symptoms.  He did have labs drawn earlier today.         Review of Systems   Constitutional:  No fever, No chills, No weakness.    Eye   Ear/Nose/Mouth/Throat:  No nasal congestion.    Respiratory:  No shortness of breath, No cough.    Cardiovascular:  No palpitations, No peripheral edema, No syncope.    Gastrointestinal:  No nausea, No vomiting, No abdominal pain.    Genitourinary:  No dysuria, No hematuria.    Hematology/Lymphatics:  Negative except as documented in history of present illness.    Endocrine:  No excessive thirst, No polyuria.    Immunologic:  No recurrent fevers.    Musculoskeletal:  No joint pain, No muscle pain.    Neurologic:  Alert and oriented X4, No confusion, No numbness, No tingling, No headache.       Health Status   Allergies:    Allergic Reactions (Selected)  Severity Not Documented  Penicillin (Rash)   Medications:  (Selected)   Prescriptions  Prescribed  Metoprolol Succinate  mg oral tablet, extended release: = 1 tab(s), Oral, daily, # 90  tab(s), 3 Refill(s), Type: Maintenance, Pharmacy: [facility], appt 10/30, 1 tab(s) Oral daily  amLODIPine 10 mg oral tablet: = 1 tab(s) ( 10 mg ), po, daily, # 90 tab(s), 3 Refill(s), Type: Maintenance, Pharmacy: [facility], 1 tab(s) Oral daily  furosemide 40 mg oral tablet: = 1 tab(s) ( 40 mg ), Oral, daily, # 90 tab(s), 3 Refill(s), Type: Maintenance, Pharmacy: [facility], 1 tab(s) Oral daily  hydrALAZINE 100 mg oral tablet: = 1 tab(s) ( 100 mg ), PO, BID, # 270 tab(s), 3 Refill(s), Type: Maintenance, Pharmacy: [facility], 1 tab(s) Oral bid  left foot and ankle orthosis: left foot and ankle orthosis, See Instructions, Instructions: use as directed, # 1 EA, 0 Refill(s), Type: Maintenance  melatonin 3 mg oral tablet: = 1 tab(s) ( 3 mg ), po, qhs, # 90 tab(s), 3 Refill(s), Type: Maintenance, Pharmacy: [facility], 1 tab(s) Oral qhs  nitroglycerin 0.4 mg sublingual tablet: 1 tab(s) ( 0.4 mg ), SL, q5min, PRN: for chest pain, # 25 tab(s), 1 Refill(s), Type: Maintenance, Pharmacy: [facility], KEEP ON FILE AND PT WILL NOTFY WHEN NEEDED, 1 tab(s) sl q5 min,PRN:for chest pain  simvastatin 20 mg oral tablet: = 1 tab(s) ( 20 mg ), Oral, hs, # 90 tab(s), 3 Refill(s), Type: Maintenance, Pharmacy: [facility], 1 tab(s) Oral hs  tamsulosin 0.4 mg oral capsule: = 1 cap(s) ( 0.4 mg ), PO, Daily, # 90 cap(s), 3 Refill(s), Type: Maintenance, Pharmacy: [facility], 1 cap(s) Oral daily  Documented Medications  Documented  Senna: po, hs, PRN: as needed for constipation, 0 Refill(s), Type: Maintenance  aspirin 325 mg oral tablet: 1 tab(s) ( 325 mg ), po, daily, 0 Refill(s), Type: Maintenance,    Medications          *denotes recorded medication          left foot and ankle orthosis: See Instructions, use as directed, 1 EA.          amLODIPine 10 mg oral tablet: 10 mg, 1 tab(s), po, daily, 90 tab(s), 3 Refill(s).          *aspirin 325 mg oral tablet: 325 mg, 1 tab(s), po, daily.           furosemide 40 mg oral tablet: 40 mg, 1 tab(s), Oral, daily, 90 tab(s), 3 Refill(s).          hydrALAZINE 100 mg oral tablet: 100 mg, 1 tab(s), PO, BID, 270 tab(s), 3 Refill(s).          melatonin 3 mg oral tablet: 3 mg, 1 tab(s), po, qhs, 90 tab(s), 3 Refill(s).          Metoprolol Succinate  mg oral tablet, extended release: 1 tab(s), Oral, daily, 90 tab(s), 3 Refill(s).          nitroglycerin 0.4 mg sublingual tablet: 0.4 mg, 1 tab(s), SL, q5min, PRN: for chest pain, 25 tab(s), 1 Refill(s).          *Senna: po, hs, PRN: as needed for constipation.          simvastatin 20 mg oral tablet: 20 mg, 1 tab(s), Oral, hs, 90 tab(s), 3 Refill(s).          tamsulosin 0.4 mg oral capsule: 0.4 mg, 1 cap(s), PO, Daily, 90 cap(s), 3 Refill(s).       Problem list:    All Problems  CKD (chronic kidney disease) stage 4, GFR 15-29 ml/min / SNOMED CT [id number] / Confirmed  Cerebrovascular accident (stroke) / SNOMED CT 038536206 / Confirmed  History of tobacco use / ICD-9-CM V15.82 / Probable  Hypertensive kidney disease with chronic kidney disease stage IV / SNOMED CT 24400300 / Confirmed  Left hemiplegia / SNOMED CT 814374710 / Confirmed  Resolved: *Hospitalized@[facility] - Chest pains  Resolved: *Hospitalized@[facility] - Weakness, generalized  Resolved: Acute on chronic renal failure / SNOMED CT 5667567468  Resolved: No pertinent past medical history / ICD-9-CM V49.9      Histories   Past Medical History:    Active  Hypertensive kidney disease with chronic kidney disease stage IV (47301050)  Cerebrovascular accident (stroke) (478770697)  Left hemiplegia (232427155)  Resolved  *Hospitalized@[facility] - Chest pains: Onset on 4/6/2014 at 64 years.  Resolved on 4/7/2014 at 64 years.  *Hospitalized@[facility] - Weakness, generalized: Onset on 9/25/2012 at 62 years.  Resolved.  No pertinent past medical history (V49.9):  Resolved.  Comments:  9/25/2012 CDT 11:55 AM CDT - Barry Vuong MD  no regular health care  Acute on  chronic renal failure (8010314505):  Resolved.   Family History:    Cancer  Mother  Father     Procedure history:    Appendectomy (774274905).  History of elbow surgery ([id number]).   Social History:        Alcohol Assessment            Past      Tobacco Assessment            Past, Cigarettes, 10 per day.  30 year(s).  Total pack years: 50.      Employment and Education Assessment            Retired      Home and Environment Assessment            Marital status: .  Spouse/Partner name: Sidra.  0 children.                     Comments:                      09/25/2012 - Barry Vuong MD                     no insurance      Exercise and Physical Activity Assessment            Exercise frequency: 5 times per week.  Exercise type: stationary bike.        Physical Examination   vital signs stable, as noted above   Vital Signs   11/19/2019 2:34 PM CST Temperature Tympanic 96.2 DegF  LOW    Peripheral Pulse Rate 72 bpm    Systolic Blood Pressure 122 mmHg    Diastolic Blood Pressure 76 mmHg    Mean Arterial Pressure 91 mmHg    BP Site Right arm      Measurements from flowsheet : Measurements   11/19/2019 2:34 PM CST   Ht/Wt Measurement Refused by Patient?     Yes     General:  Alert and oriented, No acute distress.    Eye:  Pupils are equal, round and reactive to light, Extraocular movements are intact.    HENT:  Normocephalic, Tympanic membranes are clear, Oral mucosa is moist.    Neck:  Supple.    Respiratory:  Lungs are clear to auscultation, Respirations are non-labored.    Cardiovascular:  Normal rate, Regular rhythm, No murmur.    Gastrointestinal:  Soft, Non-tender, Non-distended, Normal bowel sounds, No organomegaly.    Genitourinary:  No costovertebral angle tenderness.    Musculoskeletal:  Normal range of motion, Normal strength, No tenderness, left sided hemiparesis with contractures.    Neurologic:  Alert, Oriented, Normal motor function.    Cognition and Speech:  Oriented,  Functional cognition intact.    Psychiatric:  Appropriate mood & affect.       Review / Management   Results review      Impression and Plan   Diagnosis     Cerebrovascular Accident (Stroke) (DDF02-QU I63.50).     HTN (Hypertension) (YLK37-VI I10).     CKD (chronic kidney disease) stage 4, GFR 15-29 ml/min (YLK19-NO N18.4).         .) CKD, stage 4 - likely related to microvascular disease in setting of long-standing hypertension, tobacco use.      - baseline SCr of ~2.5-3.0; eGFR ~20mL/min, stable    - proteinuria slowly rising     - consider ACEi vs. ARB in the future    - attended pre-dialysis education classes via [facility] (6/2014). Tentative plans to begin [facility] when time to initiate comes    .) Hypertension, controlled    current antihypertensive regimen: hydralazine 100mg TID, Toprol XL 100mg daily, furosemide 40mg daily, amlodipine 10mg daily  regimen changes: none  intolerance:  future titration/work-up plan:    - SBP goal <140; would look at ARB as next adjunct    .) secondary hyperparathyroidism/ hyperphosphatemia    - phosphorus levels normal without binder therapy    - vitamin D stores replete    - not an active candidate for vitamin D analogues    .) Anemia of chronic renal disease   - Hgb 12.9   - currently not on ESAs   - follow serial Hgb and iron studies as appropriate    .) CVA   - discontinued baclofen because of sedation   - previously working with neuro in [place]    .) health maintenance   - declines all forms of age appropriate health screening   - PSA previously elevated at 8.1 as part of prostatitis work-up; Juan does not want to pursue this any further    - no targeted symptoms to suggest advanced spread of disease    RTC in 6 months

## 2022-02-15 NOTE — TELEPHONE ENCOUNTER
---------------------  From: Larissa Rae CMA   To: Chuck Anderson MD;     Sent: 6/17/2020 2:26:37 PM CDT  Subject: General Message-f/u due     Talked with Sidra and she's not comfortable bringing Juan in at this time and also very difficult getting him in here.  States he's doing well, eating okay and BP has been good---------------------  From: Chuck Anderson MD   To: Larissa Rae CMA;     Sent: 6/29/2020 12:55:19 PM CDT  Subject: RE: General Message-f/u due     I understand their concerns.  Given that he's 6 months out from labs, I would still recommend he come in for lab draw, though if they feel strongly and he feels well, I guess it's okay to wait until the falls.  Alternatively, we could think about home health referral for assessment and lab draws?

## 2022-02-15 NOTE — NURSING NOTE
Anticoagulation Therapy Management Entered On:  9/22/2020 2:15 PM CDT    Performed On:  9/22/2020 2:14 PM CDT by Jayshree Meredith RN               Anticoagulation Visit Assessment   Type of Visit - Anticoagulation :   Telephone   Anticoagulation Indication :   Atrial fibrillation   Anticoagulant Duration :   Undetermined   Anticoagulation Medication Verified :   Yes   Jayshree Meredith RN - 9/22/2020 2:14 PM CDT   Anticoagulation Patient Assessment Grid   Change in Alcohol Consumption :   No   Change in Diet :   No   Change in Medications :   No   Diarrhea :   No   Rectal Bleeding :   No   Signs of Clotting :   No   Signs of Warfarin Intolerance :   No   Unusual Bleeding, Bruising :   No   Upcoming Procedures :   No   Vomiting :   No   Jayshree Meredith RN - 9/22/2020 2:14 PM CDT   Patient on Warfarin :   Yes   Jayshree Meredith RN - 9/22/2020 2:14 PM CDT   Warfarin   Anticoagulant INR Goal Lower :   2    INR POC :   1.9    Anticoagulant INR Goal Upper :   3    Sunday :   1.25 mg   Monday :   1.25 mg   Tuesday :   1.25 mg   Wednesday :   1.25 mg   Thursday :   1.25 mg   Friday :   1.25 mg   Saturday :   1.25 mg   Total Dose :   8.75 mg   Warfarin Pt Reported Previous Week Dose :    Sun Mon Tues Wed Thurs Fri Sat Weekly Total Dose   Week 1 1.25 mg 1.25 mg 1.25 mg 1.25 mg 1.25 mg 1.25 mg 1.25 mg 8.75 mg   Week 2  mg  mg  mg  mg  mg  mg  mg  mg   Week 3  mg  mg  mg  mg  mg  mg  mg  mg   Week 4  mg  mg  mg  mg  mg  mg  mg  mg         Patient is taking single or multiple strength tablet(s) :   Single strength tab(s)   One Tab Strength :   5 mg tab   Sunday :   1.25 mg   Monday :   1.25 mg   Tuesday :   1.25 mg   Wednesday :   1.25 mg   Thursday :   1.25 mg   Friday :   1.25 mg   Saturday :   1.25 mg   Week 1 Total Dose :   8.75 mg   Sunday :   0.25 tab(s)   Monday :   0.25 tab(s)   Tuesday :   0.25 tab(s)   Wednesday :   0.25 tab(s)   Thursday :   0.25 tab(s)   Friday :   0.25 tab(s)   Saturday :   0.25 tab(s)   Patient Instructions :    [facility] INR = 1.9  Continue warfarin   1.25mg daily  recheck 1 week.  Per protocol, Faxed to tin Meredith RN, Jayshree RAMÍREZ - 9/22/2020 2:14 PM CDT

## 2022-02-15 NOTE — PROGRESS NOTES
Patient:   BEHRENS, LARRY L            MRN: [id number]            FIN: [id number]               Age:   70 years     Sex:  Male     :  1950   Associated Diagnoses:   Cerebrovascular Accident (Stroke); HTN (Hypertension); CKD (chronic kidney disease) stage 4, GFR 15-29 ml/min   Author:   Chuck Anderson MD      Visit Information      Date of Service: 2020 10:52 am  Performing Location: [facility]  Encounter#: [id number]      Primary Care Provider (PCP):  Chuck Anderson MD    NPI# [id number]      Referring Provider:  Chuck Anderson MD, NPI# [id number]   Accompanied by:  Spouse.    Source of history:  Self, Spouse.       Chief Complaint   2020 10:59 AM CST   f/u CKD            Additional Information:No additional information recorded during visit.   Chief complaint and symptoms as noted above and confirmed with patient      History of Present Illness   10/14/2013: Mr. Behrens is a 62 yo referred by Dr. Vance  to renal clinic for evaluation of CKD, stage IV.  He has previously beened evaluated by Dr. Dockery via [facility] nephrology group, last seen in .  He has previously undergone extensive renal work-up including renal U/S showing no evidence of macrovascular disease and extensive intraparenchymal disease.  Previous urine immunofization studies negative.  Serological work-up previously negative.  His most recent SCr was 3.3 in August, eGFR ~18mL/min.  He and his wife say they've had some discussion with previous nephrologist about renal replacement therapy, though still unclear about what option is best for them.  Denies any uremic symptoms.  His peripheral edema has been well controlled on metolazone as diuretic monotherapy.  Blood pressure well controlled.  Currently without insurance and working on both coverage options.     2020:  Juan returns to clinic for general follow-up.  He is very laconic without any specific complaints.  He is accompanied by staff.  Staff states that  he has had an ongoing rash involving his left leg which he itches regularly.  He denies having any associated pain or blistering amongst the rash.  He has been talking with Kailey on a regular basis.         Review of Systems   Constitutional:  No fever, No chills, No weakness.    Eye   Ear/Nose/Mouth/Throat:  No nasal congestion.    Respiratory:  No shortness of breath, No cough.    Cardiovascular:  No palpitations, No peripheral edema, No syncope.    Gastrointestinal:  No nausea, No vomiting, No abdominal pain.    Genitourinary:  No dysuria, No hematuria.    Hematology/Lymphatics:  Negative except as documented in history of present illness.    Endocrine   Immunologic:  No recurrent fevers.    Musculoskeletal:  No joint pain, No muscle pain.    Integumentary:  Rash, Pruritus.    Neurologic:  Alert and oriented X4, No confusion, No numbness, No tingling, No headache.       Health Status   Allergies:    Allergic Reactions (Selected)  Severity Not Documented  Penicillin (Rash)   Medications:  (Selected)   Prescriptions  Prescribed  Metoprolol Succinate  mg oral tablet, extended release: = 1 tab(s), Oral, daily, # 90 tab(s), 3 Refill(s), Type: Maintenance, Pharmacy: [facility], appt 10/30, 1 tab(s) Oral daily  amLODIPine 10 mg oral tablet: = 1 tab(s) ( 10 mg ), po, daily, # 90 tab(s), 3 Refill(s), Type: Maintenance, Pharmacy: [facility], 1 tab(s) Oral daily  furosemide 40 mg oral tablet: = 1 tab(s) ( 40 mg ), Oral, daily, # 90 tab(s), 3 Refill(s), Type: Maintenance, Pharmacy: [facility], 1 tab(s) Oral daily  hydrALAZINE 100 mg oral tablet: = 1 tab(s) ( 100 mg ), PO, BID, # 270 tab(s), 3 Refill(s), Type: Maintenance, Pharmacy: [facility], 1 tab(s) Oral bid  left foot and ankle orthosis: left foot and ankle orthosis, See Instructions, Instructions: use as directed, # 1 EA, 0 Refill(s), Type: Maintenance  melatonin 3 mg oral tablet: = 1 tab(s) ( 3 mg ), po, qhs, # 90 tab(s), 3 Refill(s),  Type: Maintenance, Pharmacy: [facility], 1 tab(s) Oral qhs  nitroglycerin 0.4 mg sublingual tablet: 1 tab(s) ( 0.4 mg ), SL, q5min, PRN: for chest pain, # 25 tab(s), 1 Refill(s), Type: Maintenance, Pharmacy: [facility], KEEP ON FILE AND PT WILL NOTFY WHEN NEEDED, 1 tab(s) sl q5 min,PRN:for chest pain  tamsulosin 0.4 mg oral capsule: = 1 cap(s) ( 0.4 mg ), PO, Daily, # 90 cap(s), 3 Refill(s), Type: Maintenance, Pharmacy: [facility], 1 cap(s) Oral daily  Documented Medications  Documented  Crestor 5 mg oral tablet: = 1 tab(s) ( 5 mg ), Oral, daily, 0 Refill(s), Type: Maintenance  Milk of Magnesia: Oral, hs, PRN: as needed for constipation, 0 Refill(s), Type: Maintenance  MiraLax oral powder for reconstitution: ( 17 gm ), Oral, daily, PRN: for constipation, 0 Refill(s), Type: Maintenance  Senna: po, hs, PRN: as needed for constipation, 0 Refill(s), Type: Maintenance  aspirin 81 mg oral tablet, chewable: = 1 tab(s) ( 81 mg ), Oral, daily, Type: Maintenance  warfarin 7.5 mg oral tablet: See Instructions, Instructions: alternating 7.5, 5mg QOD, Type: Maintenance,    Medications          *denotes recorded medication          left foot and ankle orthosis: See Instructions, use as directed, 1 EA.          amLODIPine 10 mg oral tablet: 10 mg, 1 tab(s), po, daily, 90 tab(s), 3 Refill(s).          *aspirin 81 mg oral tablet, chewable: 81 mg, 1 tab(s), Oral, daily.          furosemide 40 mg oral tablet: 40 mg, 1 tab(s), Oral, daily, 90 tab(s), 3 Refill(s).          hydrALAZINE 100 mg oral tablet: 100 mg, 1 tab(s), PO, BID, 270 tab(s), 3 Refill(s).          *Milk of Magnesia: Oral, hs, PRN: as needed for constipation, 0 Refill(s).          melatonin 3 mg oral tablet: 3 mg, 1 tab(s), po, qhs, 90 tab(s), 3 Refill(s).          Metoprolol Succinate  mg oral tablet, extended release: 1 tab(s), Oral, daily, 90 tab(s), 3 Refill(s).          nitroglycerin 0.4 mg sublingual tablet: 0.4 mg, 1 tab(s), SL, q5min, PRN:  for chest pain, 25 tab(s), 1 Refill(s).          *MiraLax oral powder for reconstitution: 17 gm, Oral, daily, PRN: for constipation, 0 Refill(s).          *Crestor 5 mg oral tablet: 5 mg, 1 tab(s), Oral, daily, 0 Refill(s).          *Senna: po, hs, PRN: as needed for constipation.          tamsulosin 0.4 mg oral capsule: 0.4 mg, 1 cap(s), PO, Daily, 90 cap(s), 3 Refill(s).          *warfarin 7.5 mg oral tablet: See Instructions, alternating 7.5, 5mg QOD.       Problem list:    All Problems  CKD (chronic kidney disease) stage 4, GFR 15-29 ml/min / SNOMED CT [id number] / Confirmed  Cerebrovascular accident (stroke) / SNOMED CT 691641391 / Confirmed  History of tobacco use / ICD-9-CM V15.82 / Probable  Hypertensive kidney disease with chronic kidney disease stage IV / SNOMED CT 50876301 / Confirmed  Left hemiplegia / SNOMED CT 077585951 / Confirmed  Resolved: *Hospitalized@[facility] - Chest pains  Resolved: *Hospitalized@[facility] - Weakness, generalized  Resolved: Acute on chronic renal failure / SNOMED CT 1730758159  Resolved: Inpatient stay / SNOMED CT 458097753  Resolved: No pertinent past medical history / ICD-9-CM V49.9      Histories   Past Medical History:    Active  Hypertensive kidney disease with chronic kidney disease stage IV (24213717)  Cerebrovascular accident (stroke) (489295817)  Left hemiplegia (269558397)  Resolved  Inpatient stay (156103583): Onset on 7/22/2020 at 70 years.  Resolved on 7/24/2020 at 70 years.  Comments:  7/28/2020 CDT 2:45 PM CDT - [facility] [place] - Paroxysmal Afib; Generalized weakness.  *Hospitalized@[facility] - Chest pains: Onset on 4/6/2014 at 64 years.  Resolved on 4/7/2014 at 64 years.  *Hospitalized@[facility] - Weakness, generalized: Onset on 9/25/2012 at 62 years.  Resolved.  No pertinent past medical history (V49.9):  Resolved.  Comments:  9/25/2012 CDT 11:55 AM CDT - Barry Vuong MD  no regular health care  Acute on chronic renal failure  (5981054376):  Resolved.   Family History:    Cancer  Mother  Father     Procedure history:    Appendectomy (588369360).  History of elbow surgery ([id number]).   Social History:        Electronic Cigarette/Vaping Assessment            Electronic Cigarette Use: Never.      Alcohol Assessment            Past      Tobacco Assessment            Former smoker, quit more than 30 days ago            Past, Cigarettes, 10 per day.  30 year(s).  Total pack years: 50.      Employment and Education Assessment            Retired      Home and Environment Assessment            Marital status: .  Spouse/Partner name: Sidra.  0 children.                     Comments:                      09/25/2012 - Barry Vuong MD                     no insurance      Exercise and Physical Activity Assessment            Exercise frequency: 5 times per week.  Exercise type: stationary bike.        Physical Examination   vital signs stable, as noted above   Vital Signs   12/9/2020 10:59 AM CST Temperature Tympanic 97.4 DegF  LOW    Peripheral Pulse Rate 63 bpm    Systolic Blood Pressure 120 mmHg    Diastolic Blood Pressure 60 mmHg    Mean Arterial Pressure 80 mmHg    Oxygen Saturation 96 %      Measurements from flowsheet : Measurements   12/9/2020 10:59 AM CST Height/Length Estimated 70.5 in    Weight Measured - Standard 167 lb      General:  Alert and oriented, No acute distress.    Eye:  Pupils are equal, round and reactive to light, Extraocular movements are intact.    HENT:  Normocephalic, Tympanic membranes are clear, Oral mucosa is moist.    Neck:  Supple.    Respiratory:  Lungs are clear to auscultation, Respirations are non-labored.    Cardiovascular:  Normal rate, Regular rhythm, No murmur.    Gastrointestinal:  Soft, Non-tender, Non-distended, Normal bowel sounds, No organomegaly.    Genitourinary:  No costovertebral angle tenderness.    Musculoskeletal:  Normal range of motion, Normal strength, No  tenderness, left sided hemiparesis with contractures.    Neurologic:  Alert, Oriented, Normal motor function.    Cognition and Speech:  Oriented, Functional cognition intact.    Psychiatric:  Appropriate mood & affect.       Review / Management   Results review:  Lab results   12/7/2020 3:36 PM CST Sodium Level  mEq/L    Potassium Level TR 3.3 mEq/L    Chloride  mEq/L    CO2 TR 23 mEq/L    Glucose Level  mg/dL    BUN TR 42 mg/dL    Creatinine TR 12 mg/dL    BUN/Creatinine Ratio TR 3.39    Calcium TR 8.9 mEq/dL    Phosphorus Level TR 4.4 mg/dL    Albumin Level TR 3.7 g/dL   10/13/2020 3:22 PM CDT INR POC 2.1   9/22/2020 2:14 PM CDT INR POC 1.9   9/9/2020 11:00 AM CDT INR TR 2.8   .       Impression and Plan   Diagnosis     Cerebrovascular Accident (Stroke) (NYB35-FL I63.50).     HTN (Hypertension) (RLR07-RA I10).     CKD (chronic kidney disease) stage 4, GFR 15-29 ml/min (XOA86-JT N18.4).         .) CKD, stage 4 - likely related to microvascular disease in setting of long-standing hypertension, tobacco use.      - baseline SCr of ~3.0-3.5; eGFR ~20mL/min, stable    - previously attended pre-dialysis education classes via [facility] (6/2014). Given transition to SNF which looks indefinite, no plans for future RRT    .) Hypertension, controlled    current antihypertensive regimen: hydralazine 100mg BID, Toprol XL 100mg daily, furosemide 40mg daily, amlodipine 10mg daily  regimen changes: none  intolerance:  future titration/work-up plan:    - SBP goal <140; would look at ARB as next adjunct    .) secondary hyperparathyroidism/ hyperphosphatemia    - phosphorus levels normal without binder therapy    - vitamin D stores replete    - not an active candidate for vitamin D analogues    .) paroxysmal afib   - on warfarin    .) Anemia of chronic renal disease   - currently not on ESAs    .) CVA   - discontinued baclofen because of sedation   - previously working with neuro in [place]    .[place]  maintenance   - declines all forms of age appropriate health screening   - residing in [facility] since summer, 2020 - not likely to be able to return to independent living    RTC in 3 months

## 2022-02-15 NOTE — PROGRESS NOTES
Patient:   BEHRENS, LARRY L            MRN: [id number]            FIN: [id number]               Age:   67 years     Sex:  Male     :  1950   Associated Diagnoses:   Cerebrovascular Accident (Stroke); HTN (Hypertension); Chronic Kidney Disease (CKD), Stage IV (Severe)   Author:   Chuck Anderson MD      Visit Information      Date of Service: 2017 01:47 pm  Performing Location: [facility]  Encounter#: [id number]      Primary Care Provider (PCP):  Chuck Anderson MD    NPI# [id number]      Referring Provider:  No referring provider recorded for selected visit.   Accompanied by:  Spouse.    Source of history:  Self, Spouse.       Chief Complaint   2017 1:56 PM CDT     1.  f/u CKD  2.  c/o coughing, SOB, watery eyes, sneezing x 1wk on/off              Additional Information:No additional information recorded during visit.   Chief complaint and symptoms as noted above and confirmed with patient      History of Present Illness   10/14/2013: Mr. Behrens is a 64 yo referred by Dr. Vance  to renal clinic for evaluation of CKD, stage IV.  He has previously beened evaluated by Dr. Dockery via [facility] nephrology group, last seen in .  He has previously undergone extensive renal work-up including renal U/S showing no evidence of macrovascular disease and extensive intraparenchymal disease.  Previous urine immunofization studies negative.  Serological work-up previously negative.  His most recent SCr was 3.3 in August, eGFR ~18mL/min.  He and his wife say they've had some discussion with previous nephrologist about renal replacement therapy, though still unclear about what option is best for them.  Denies any uremic symptoms.  His peripheral edema has been well controlled on metolazone as diuretic monotherapy.  Blood pressure well controlled.  Currently without insurance and working on both coverage options.     2017: Returns to clinic for f/u of CKD.  Labs reviewed.  No voiced complaints.   No active LUTs.  No bone pains.Presents for follow-up related to his chronic kidney disease and hypertension.  Overall no substantial changes in health.  For the past 1 week has had nonproductive cough with rhinorrhea, congestion, scratchy eyes.  He does not say that he feels ill.  No known history of allergy symptoms.  Larissa raises concerns about him getting breathless with his coughing.  No fever chills.         Review of Systems   Constitutional:  No fever, No chills.    Eye:       Discharge: Bilateral.    Ear/Nose/Mouth/Throat:  Nasal congestion.    Respiratory:  Cough, No shortness of breath.    Cardiovascular:  Chest pain, No palpitations, No peripheral edema, No syncope.    Gastrointestinal:  No nausea, No vomiting, No abdominal pain.    Genitourinary:  No dysuria, No hematuria.    Hematology/Lymphatics:  Negative except as documented in history of present illness.    Endocrine:  No excessive thirst, No polyuria.    Immunologic:  No recurrent fevers.    Musculoskeletal:  No joint pain, No muscle pain.    Neurologic:  Alert and oriented X4, No numbness, No tingling, No headache.       Health Status   Allergies:    Allergic Reactions (Selected)  Severity Not Documented  Penicillin (No reactions were documented)   Medications:  (Selected)   Prescriptions  Prescribed  Lasix 40 mg oral tablet: 1 tab(s) ( 40 mg ), PO, Daily, # 90 tab(s), 3 Refill(s), Type: Maintenance, Pharmacy: [facility], KEEP ON FILE AND PT WILL NOTFY WHEN NEEDED, 1 tab(s) po daily  amLODIPine 10 mg oral tablet: 1 tab(s) ( 10 mg ), PO, Daily, # 90 tab(s), 3 Refill(s), Type: Maintenance, Pharmacy: [facility], KEEP ON FILE AND PT WILL NOTFY WHEN NEEDED, 1 tab(s) po daily  hydrALAZINE 50 mg oral tablet: 1 tab(s) ( 50 mg ), po, tid, # 270 tab(s), 3 Refill(s), Type: Maintenance, Pharmacy: [facility], KEEP ON FILE AND PT WILL NOTFY WHEN NEEDED, 1 tab(s) po tid  left foot and ankle orthosis: left foot and ankle orthosis, See  Instructions, Instructions: use as directed, # 1 EA, 0 Refill(s), Type: Maintenance  melatonin 3 mg oral tablet: 1 tab(s) ( 3 mg ), po, qhs, # 90 tab(s), 3 Refill(s), Type: Maintenance, Pharmacy: [facility], KEEP ON FILE AND PT WILL NOTFY WHEN NEEDED, 1 tab(s) po qhs  metoprolol succinate 100 mg oral tablet, extended release: 1 tab(s) ( 100 mg ), PO, Daily, # 90 tab(s), 3 Refill(s), Type: Maintenance, Pharmacy: [facility], KEEP ON FILE AND PT WILL NOTFY WHEN NEEDED, 1 tab(s) po daily  nitroglycerin 0.4 mg sublingual tablet: 1 tab(s) ( 0.4 mg ), SL, q5min, PRN: for chest pain, # 25 tab(s), 1 Refill(s), Type: Maintenance, Pharmacy: [facility], KEEP ON FILE AND PT WILL NOTFY WHEN NEEDED, 1 tab(s) sl q5 min,PRN:for chest pain  simvastatin 20 mg oral tablet: 1 tab(s) ( 20 mg ), PO, Once a day (at bedtime), # 90 tab(s), 3 Refill(s), Type: Maintenance, Pharmacy: [facility], KEEP ON FILE AND PT WILL NOTFY WHEN NEEDED, 1 tab(s) po hs  tamsulosin 0.4 mg oral capsule: 1 cap(s) ( 0.4 mg ), PO, Daily, # 90 cap(s), 3 Refill(s), Type: Maintenance, Pharmacy: [facility], KEEP ON FILE AND PT WILL NOTFY WHEN NEEDED, 1 cap(s) po daily  Documented Medications  Documented  Senna: po, hs, PRN: as needed for constipation, 0 Refill(s), Type: Maintenance  aspirin 325 mg oral tablet: 1 tab(s) ( 325 mg ), po, daily, 0 Refill(s), Type: Maintenance   Problem list:    All Problems  Cerebrovascular Accident (Stroke) / ICD-9-.91 / Confirmed  CKD (chronic kidney disease) stage 4, GFR 15-29 ml/min / SNOMED CT [id number] / Confirmed  History of tobacco use / ICD-9-CM V15.82 / Probable  Hypertensive kidney disease with chronic kidney disease stage IV / SNOMED CT 24150755 / Confirmed  Left Hemiplegia / ICD-9-.90 / Confirmed  Resolved: *Hospitalized@[facility] - Chest pains  Resolved: *Hospitalized@[facility] - Weakness, generalized  Resolved: Acute on chronic renal failure / ICD-9-CM  584.9  Resolved: No pertinent past medical history / ICD-9-CM V49.9      Histories   Past Medical History:    Active  Hypertensive kidney disease with chronic kidney disease stage IV (05856855)  Left Hemiplegia (342.90)  Resolved  *Hospitalized@[facility] - Chest pains: Onset on 4/6/2014 at 64 years.  Resolved on 4/7/2014 at 64 years.  *Hospitalized@[facility] - Weakness, generalized: Onset on 9/25/2012 at 62 years.  Resolved.  No pertinent past medical history (V49.9):  Resolved.  Comments:  9/25/2012 CDT 11:55 AM CDT - Barry Vuong MD  no regular health care  Acute on chronic renal failure (584.9):  Resolved.   Family History:    Cancer  Mother  Father     Procedure history:    Appendectomy (100461585).  History of elbow surgery ([id number]).   Social History:        Alcohol Assessment: High Risk            Current, Beer (12 oz), 4 times per week, 4 drinks/episode average.  5 drinks/episode maximum.      Tobacco Assessment            Past, Cigarettes, 10 per day.  30 year(s).  Total pack years: 50.      Substance Abuse Assessment: Denies Substance Abuse      Employment and Education Assessment            Retired      Home and Environment Assessment            Marital status: .  Spouse/Partner name: Sidra.  Chris children.                     Comments:                      09/25/2012 - Barry Vuong MD                     no insurance      Exercise and Physical Activity Assessment            Exercise frequency: 5 times per week.  Exercise type: stationary bike.        Physical Examination   vital signs stable, as noted above   Vital Signs   4/6/2017 1:56 PM CDT Temperature Tympanic 97.3 DegF  LOW    Peripheral Pulse Rate 61 bpm    Systolic Blood Pressure 149 mmHg  HI    Diastolic Blood Pressure 76 mmHg    Mean Arterial Pressure 100 mmHg    BP Systolic Repeat 137 mmHg    BP Diastolic Repeat 83 mmHg    BP Site Repeat Right arm    Oxygen Saturation 97 %      Measurements from flowsheet :  Measurements   4/6/2017 1:56 PM CDT     Weight Measured - Standard                196 lb     General:  Alert and oriented, No acute distress.    Eye:  Extraocular movements are intact.    HENT:  Normocephalic, Tympanic membranes are clear, Oral mucosa is moist, injected slcera bilaterally.    Neck:  Supple, No lymphadenopathy.    Respiratory:  Lungs are clear to auscultation, Respirations are non-labored.    Cardiovascular:  Normal rate, Regular rhythm, No murmur.    Gastrointestinal:  Soft, Non-distended, Normal bowel sounds, No organomegaly.    Genitourinary:  No costovertebral angle tenderness.    Musculoskeletal:  Normal range of motion, Normal strength, No tenderness, left sided hemiparesis with contractures.    Neurologic:  Alert, Oriented, Normal motor function, No focal deficits.    Cognition and Speech:  Oriented, word apraxia, some dysarthric speech.    Psychiatric:  Appropriate mood & affect.       Review / Management   Results review:  Lab results   3/30/2017 1:40 PM CDT Sodium Level 142 mmol/L    Potassium Level 3.9 mmol/L    Chloride Level 105 mmol/L    CO2 Level 25 mmol/L    Glucose Level 84 mg/dL    BUN 40 mg/dL  HI    Creatinine 2.57 mg/dL  HI    BUN/Creat Ratio 16    eGFR 25 mL/min/1.73m2  LOW    eGFR African American 29 mL/min/1.73m2  LOW    Calcium Level 9.5 mg/dL    Calcium Level 9.5 mg/dL    Phosphorus Level 4.3 mg/dL    PTH Intact 152 pg/mL  HI    Albumin Level 4.5 gm/dL    Cholesterol 145 mg/dL    Non-    HDL 42 mg/dL    Chol/HDL Ratio 3.5    LDL 78    Triglyceride 124 mg/dL    U Protein 49 mg/dL  HI    U Protein/Creatinine Ratio 598  HI    Ur Creatinine 82 mg/dL    Hgb 14.1 gm/dL   .       Impression and Plan   Diagnosis     Cerebrovascular Accident (Stroke) (KRD98-AA I63.50).     HTN (Hypertension) (FYI00-ZD I10).     Chronic Kidney Disease (CKD), Stage IV (Severe) (PVL60-MU N18.4).         .) CKD, stage IV - likely related to microvascular disease in setting of long-standing  hypertension, tobaco use.      - baseline SCr of ~2.5-3.0; eGFR ~20mL/min, stable    - CO2 fine    - proteinuria slowly rising     - consider ACEi vs. ARB in the future if proteinuria >0.5g/g    - attended pre-dialysis education classes via [facility] (6/2014). Tentative plans to begin CCPD when time to initiate comes    .) URI vs. allergy symptoms   - not appearing ill; lungs clear.  No recent malaise   - raises suspicions for allergies. Monitor symptoms for now.  Consider use of OTC antihistamine or intranasal steroid if symptoms persist    .) Hypertension, controlled   - currently near goal of SBP <140   - continue Toprol XL 100mg daily, amlodipine 10mg, doxazosin 2mg qhs, hydralazine 50mg TID, lasix 40mg daily   .) secondary hyperparathyroidism/ hyperphosphatemia    - phosphorus levels normal without binder therapy    - vitamin D stores replete    - not an active candidate for vitamin D analogues    .) Anemia of chronic renal disease   - Hgb 14.1   - currently not on ESAs   - follow serial Hgb and iron studies as appropriate    .) CVA   - worked with PT/OT   - discontinued baclofen because of sedation   - working with neuro in [place]    .) health maintenance   - declines all forms of age appropriate health screening   - PSA elevated at 8.1 as part of prostatitis work-up; Juan does not want to pursue this any further    - no targeted symptoms to suggestive advanced spread of disease    RTC q 6 months

## 2022-02-15 NOTE — NURSING NOTE
Anticoagulation Therapy Management Entered On:  8/3/2020 12:29 PM CDT    Performed On:  8/3/2020 12:27 PM CDT by Chantal Zarate RN               Anticoagulation Visit Assessment   Anticoagulation Indication :   Atrial fibrillation   Anticoagulation Medication Verified :   Yes   Patient on Warfarin :   Yes   Chantal Zarate RN - 8/3/2020 12:27 PM CDT   Warfarin   Anticoagulant INR Goal Lower :   2    Anticoagulant INR Goal Upper :   3    Sunday :   5 mg   Monday :   7.5 mg   Tuesday :   5 mg   Wednesday :   7.5 mg   Thursday :   5 mg   Friday :   0 mg   Saturday :   0 mg   Total Dose :   30 mg   Warfarin Pt Reported Previous Week Dose :    Sun Mon Tues Wed Thurs Fri Sat Weekly Total Dose   Week 1                   Week 2                   Week 3                   Week 4                         Patient is taking single or multiple strength tablet(s) :   Single strength tab(s)   One Tab Strength :   5 mg tab   Sunday :   0 mg   Monday :   0 mg   Tuesday :   0 mg   Wednesday :   0 mg   Thursday :   0 mg   Friday :   0 mg   Saturday :   0 mg   Week 1 Total Dose :   0 mg   Sunday :   0 tab(s)   Monday :   0 tab(s)   Tuesday :   0 tab(s)   Wednesday :   0 tab(s)   Thursday :   0 tab(s)   Friday :   0 tab(s)   Saturday :   0 tab(s)   Patient Instructions :   Venous INR from [facility] = 5.6; per protocol continue to hold warfarin, recheck INR on 8/5/2020; faxed to [facility].     Chantal Zarate RN - 8/3/2020 12:27 PM CDT

## 2022-02-15 NOTE — NURSING NOTE
Anticoagulation Therapy Management Entered On:  9/4/2020 2:18 PM CDT    Performed On:  9/4/2020 1:53 PM CDT by Geri Valdez RN               Anticoagulation Visit Assessment   Anticoagulation Indication :   Atrial fibrillation   Anticoagulant Duration :   Undetermined   Geri Valdez RN - 9/4/2020 1:53 PM CDT   Anticoagulation Patient Assessment Grid   Change in Alcohol Consumption :   No   Change in Diet :   No   Change in Medications :   No   Diarrhea :   No   Rectal Bleeding :   No   Signs of Clotting :   No   Signs of Warfarin Intolerance :   No   Unusual Bleeding, Bruising :   No   Upcoming Procedures :   No   Vomiting :   No   Geri Valdez RN - 9/4/2020 1:53 PM CDT   Patient on Warfarin :   Yes   Geri Valdez RN - 9/4/2020 1:53 PM CDT   Warfarin   Anticoagulant INR Goal Lower :   2    Anticoagulant INR Goal Upper :   3    Information Given by :   Other: [facility] fax   Sunday :   1.25 mg   Monday :   1.25 mg   Tuesday :   1.25 mg   Wednesday :   1.25 mg   Thursday :   1.25 mg   Friday :   1.25 mg   Saturday :   1.25 mg   Total Dose :   8.75 mg   Warfarin Pt Reported Previous Week Dose :    Sun Mon Tues Wed Thurs Fri Sat Weekly Total Dose   Week 1                   Week 2                   Week 3                   Week 4                         Patient is taking single or multiple strength tablet(s) :   Single strength tab(s)   One Tab Strength :   5 mg tab   Sunday :   1.25 mg   Monday :   1.25 mg   Tuesday :   1.25 mg   Wednesday :   1.25 mg   Thursday :   1.25 mg   Friday :   1.25 mg   Saturday :   1.25 mg   Week 1 Total Dose :   8.75 mg   Sunday :   0.25 tab(s)   Monday :   0.25 tab(s)   Tuesday :   0.25 tab(s)   Wednesday :   0.25 tab(s)   Thursday :   0.25 tab(s)   Friday :   0.25 tab(s)   Saturday :   0.25 tab(s)   Patient Instructions :   INR = 2.7 per [facility] fax today. Patient is to stay on 1.25mg warfarin daily and recheck INR on 9/9/20. Directions faxed to [facility]     Geri Valdez RN  9/4/2020 1:53 PM CDT

## 2022-02-15 NOTE — NURSING NOTE
Anticoagulation Therapy Management Entered On:  8/7/2020 2:16 PM CDT    Performed On:  8/7/2020 2:15 PM CDT by Chantal Zarate RN               Anticoagulation Visit Assessment   Anticoagulation Indication :   Atrial fibrillation   Anticoagulation Medication Verified :   Yes   Chantal Zarate RN - 8/7/2020 2:15 PM CDT   Anticoagulation Patient Assessment Grid   Change in Alcohol Consumption :   No   Change in Diet :   No   Change in Medications :   No   Diarrhea :   No   Rectal Bleeding :   No   Signs of Clotting :   No   Signs of Warfarin Intolerance :   No   Unusual Bleeding, Bruising :   No   Upcoming Procedures :   No   Vomiting :   No   Chantal Zarate RN - 8/7/2020 2:15 PM CDT   Contributing Factors :   Other: Stage 2 ulcer   Patient on Warfarin :   Yes   Chantal Zarate RN - 8/7/2020 2:15 PM CDT   Warfarin   Anticoagulant INR Goal Lower :   2    Anticoagulant INR Goal Upper :   3    INR Home Monitoring Result :   3.9    Sunday :   0 mg   Monday :   0 mg   Tuesday :   0 mg   Wednesday :   0 mg   Thursday :   0 mg   Friday :   0 mg   Saturday :   0 mg   Total Dose :   0 mg   Warfarin Pt Reported Previous Week Dose :    Sun Mon Tues Wed Thurs Fri Sat Weekly Total Dose   Week 1                   Week 2                   Week 3                   Week 4                         Patient is taking single or multiple strength tablet(s) :   Single strength tab(s)   One Tab Strength :   5 mg tab   Sunday :   0 mg   Monday :   0 mg   Tuesday :   0 mg   Wednesday :   0 mg   Thursday :   0 mg   Friday :   0 mg   Saturday :   0 mg   Week 1 Total Dose :   0 mg   Sunday :   0 tab(s)   Monday :   0 tab(s)   Tuesday :   0 tab(s)   Wednesday :   0 tab(s)   Thursday :   0 tab(s)   Friday :   0 tab(s)   Saturday :   0 tab(s)   Patient Instructions :   Fax received from [facility] with INR of 3.9; per protocol continue to hold warfarin, recheck INR on 8/10/2020; faxed to [facility].     Chantal Zarate RN - 8/7/2020 2:15 PM CDT

## 2022-02-15 NOTE — NURSING NOTE
Anticoagulation Therapy Management Entered On:  8/17/2020 4:33 PM CDT    Performed On:  8/17/2020 4:26 PM CDT by Geri Valdez RN               Anticoagulation Visit Assessment   Anticoagulation Indication :   Atrial fibrillation   Anticoagulant Duration :   Undetermined   Anticoagulation Medication Verified :   Yes   Geri Valdez RN - 8/17/2020 4:26 PM CDT   Anticoagulation Patient Assessment Grid   Change in Alcohol Consumption :   No   Change in Diet :   No   Change in Medications :   No   Diarrhea :   No   Rectal Bleeding :   No   Signs of Clotting :   No   Signs of Warfarin Intolerance :   No   Unusual Bleeding, Bruising :   No   Upcoming Procedures :   No   Vomiting :   No   Geri Valdez RN - 8/17/2020 4:26 PM CDT   Patient on Warfarin :   Yes   Patient on Other Anticoagulant :   No   Geri Valdez RN - 8/17/2020 4:26 PM CDT   Warfarin   International Normalization Ratio TR :   3.3    Anticoagulant INR Goal Lower :   2    Anticoagulant INR Goal Upper :   3    Information Given by :   Other: [facility]   Sunday :   2.5 mg   Monday :   2.5 mg   Tuesday :   2.5 mg   Wednesday :   2.5 mg   Thursday :   2.5 mg   Friday :   2.5 mg   Saturday :   2.5 mg   Total Dose :   17.5 mg   Warfarin Pt Reported Previous Week Dose :    Sun Mon Tues Wed Thurs Fri Sat Weekly Total Dose   Week 1                   Week 2                   Week 3                   Week 4                         Patient is taking single or multiple strength tablet(s) :   Single strength tab(s)   One Tab Strength :   5 mg tab   Sunday :   2.5 mg   Monday :   0 mg   Tuesday :   1.25 mg   Wednesday :   2.5 mg   Thursday :   2.5 mg   Friday :   1.25 mg   Saturday :   2.5 mg   Week 1 Total Dose :   12.5 mg   Sunday :   0.5 tab(s)   Monday :   0 tab(s)   Tuesday :   0.25 tab(s)   Wednesday :   0.5 tab(s)   Thursday :   0.5 tab(s)   Friday :   0.25 tab(s)   Saturday :   0.5 tab(s)   Patient Instructions :   INR = 3.3 per [facility] fax from 8/17/20  Patient is to hold warfarin today then take 1.25mg on Tues and Fri and 2.5mg the rest of the days of the week. Recheck INR on 8/21/20. Directions faxed to Geri Trevino RN 8/17/2020 4:26 PM CDT   Medication History   Medication List   (As Of: 8/17/2020 4:33:27 PM CDT)   Prescription/Discharge Order    tamsulosin  :   tamsulosin ; Status:   Prescribed ; Ordered As Mnemonic:   tamsulosin 0.4 mg oral capsule ; Simple Display Line:   0.4 mg, 1 cap(s), PO, Daily, 90 cap(s), 3 Refill(s) ; Ordering Provider:   Chuck Anderson MD; Catalog Code:   tamsulosin ; Order Dt/Tm:   11/19/2019 5:39:49 PM CST          amLODIPine  :   amLODIPine ; Status:   Prescribed ; Ordered As Mnemonic:   amLODIPine 10 mg oral tablet ; Simple Display Line:   10 mg, 1 tab(s), po, daily, 90 tab(s), 3 Refill(s) ; Ordering Provider:   Chuck Anderson MD; Catalog Code:   amLODIPine ; Order Dt/Tm:   11/19/2019 5:39:48 PM CST          hydrALAZINE  :   hydrALAZINE ; Status:   Prescribed ; Ordered As Mnemonic:   hydrALAZINE 100 mg oral tablet ; Simple Display Line:   100 mg, 1 tab(s), PO, BID, 270 tab(s), 3 Refill(s) ; Ordering Provider:   Chuck Anderson MD; Catalog Code:   hydrALAZINE ; Order Dt/Tm:   11/19/2019 5:39:48 PM CST          simvastatin  :   simvastatin ; Status:   Prescribed ; Ordered As Mnemonic:   simvastatin 20 mg oral tablet ; Simple Display Line:   20 mg, 1 tab(s), Oral, hs, 90 tab(s), 3 Refill(s) ; Ordering Provider:   Chuck Anderson MD; Catalog Code:   simvastatin ; Order Dt/Tm:   11/19/2019 5:39:52 PM CST          melatonin  :   melatonin ; Status:   Prescribed ; Ordered As Mnemonic:   melatonin 3 mg oral tablet ; Simple Display Line:   3 mg, 1 tab(s), po, qhs, 90 tab(s), 3 Refill(s) ; Ordering Provider:   Chuck Anderson MD; Catalog Code:   melatonin ; Order Dt/Tm:   12/18/2018 12:30:54 PM CST          furosemide  :   furosemide ; Status:   Prescribed ; Ordered As Mnemonic:   furosemide 40 mg oral tablet ; Simple Display Line:   40 mg,  1 tab(s), Oral, daily, 90 tab(s), 3 Refill(s) ; Ordering Provider:   Chuck Anderson MD; Catalog Code:   furosemide ; Order Dt/Tm:   11/19/2019 5:39:47 PM CST          metoprolol  :   metoprolol ; Status:   Prescribed ; Ordered As Mnemonic:   Metoprolol Succinate  mg oral tablet, extended release ; Simple Display Line:   1 tab(s), Oral, daily, 90 tab(s), 3 Refill(s) ; Ordering Provider:   Chuck Anderson MD; Catalog Code:   metoprolol ; Order Dt/Tm:   11/19/2019 5:39:51 PM CST          nitroglycerin  :   nitroglycerin ; Status:   Prescribed ; Ordered As Mnemonic:   nitroglycerin 0.4 mg sublingual tablet ; Simple Display Line:   0.4 mg, 1 tab(s), SL, q5min, PRN: for chest pain, 25 tab(s), 1 Refill(s) ; Ordering Provider:   Chuck Andreson MD; Catalog Code:   nitroglycerin ; Order Dt/Tm:   9/29/2016 7:25:36 PM CDT          Miscellaneous Rx Supply  :   Miscellaneous Rx Supply ; Status:   Prescribed ; Ordered As Mnemonic:   left foot and ankle orthosis ; Simple Display Line:   See Instructions, use as directed, 1 EA ; Ordering Provider:   Salomon Vance MD; Catalog Code:   Miscellaneous Rx Supply ; Order Dt/Tm:   7/24/2013 10:29:40 AM CDT            Home Meds    aspirin  :   aspirin ; Status:   Documented ; Ordered As Mnemonic:   aspirin 81 mg oral tablet, chewable ; Simple Display Line:   81 mg, 1 tab(s), Oral, daily ; Catalog Code:   aspirin ; Order Dt/Tm:   7/27/2020 9:04:22 AM CDT          senna  :   senna ; Status:   Documented ; Ordered As Mnemonic:   Senna ; Simple Display Line:   po, hs, PRN: as needed for constipation ; Catalog Code:   senna ; Order Dt/Tm:   10/2/2012 11:15:43 AM CDT          warfarin  :   warfarin ; Status:   Documented ; Ordered As Mnemonic:   warfarin 7.5 mg oral tablet ; Simple Display Line:   See Instructions, alternating 7.5, 5mg QOD ; Catalog Code:   warfarin ; Order Dt/Tm:   7/27/2020 9:04:40 AM CDT

## 2022-02-15 NOTE — TELEPHONE ENCOUNTER
---------------------  From: Rosanna Cortes CMA (Phone Messages Pool ([id number]_[facility]))   To: INR Pool ( [id number]_[facility]);     Sent: 9/21/2020 5:29:22 PM CDT  Subject: Warfarin orders     Margaret calls from [facility] at 1718 with INR result of 1.9 asked for Warfarin dose. Spoke with Heidi Fagan, RN continue current dose since fax came after 4pm and INR nurse will address fax tomorrow. Margaret expressed understanding.FAXED

## 2022-02-15 NOTE — PROGRESS NOTES
BEHRENS, REDDY LUCERO. :  1950  2020 MRN: [id number]  [facility] Visit  S: The resident is overall frustrated.  He does not want to be in the [facility] any longer.  He prefers to be at home and does not understand why he remains.    O: He is lying in bed in no distress.  Regular rate and rhythm.  Baseline left-sided hemiparesis with contractures.    A/P: A 70-year-old with history of general deconditioning and fall at home now residing at the [facility] for rehab purposes.    1. Remote history of cerebrovascular accident with baseline deconditioning.  Unclear of future timeline and appropriateness for returning home.  I am afraid his needs of care have exceeded what can be pursued at home.  I discussed with him that if intentions are of returning home it will be important for him to continue to work with physical therapy.   2. Chronic kidney disease stage 4.  Baseline serum creatinine between 3 and 3.5.   3. Hypertension maintained on hydralazine, Toprol XL, furosemide and amlodipine.     D:  2020  T:  2020 Chuck Anderson MD/sq    c:  [facility]

## 2022-02-15 NOTE — LETTER
(Inserted Image. Unable to display)   November 21, 2019      LARRY BEHRENS  [address] [place] [id number]        Dear REDDY,     Thank you for selecting [facility] ([facility]viously [facility]) for your healthcare needs. Below you will find the results of your recent test(s) done at our clinic.      Labs all remain stable.  No concerns.       Result Name Current Result Previous Result Reference Range   Sodium Level (mmol/L)  139 11/19/2019  140 4/30/2019 135 - 146   Potassium Level (mmol/L)  3.8 11/19/2019  4.2 4/30/2019 3.5 - 5.3   Chloride Level (mmol/L)  102 11/19/2019  105 4/30/2019 98 - 110   CO2 Level (mmol/L)  24 11/19/2019  23 4/30/2019 20 - 32   Glucose Level (mg/dL)  95 11/19/2019  88 4/30/2019 65 - 99   BUN (mg/dL) ((H)) 51 11/19/2019 ((H)) 51 4/30/2019 7 - 25   Creatinine Level (mg/dL) ((H)) 3.25 11/19/2019 ((H)) 2.96 4/30/2019 0.70 - 1.25   BUN/Creat Ratio  16 11/19/2019  17 4/30/2019 6 - 22   eGFR (mL/min/1.73m2) ((L)) 18 11/19/2019 ((L)) 21 4/30/2019 > OR = 60 -    eGFR  (mL/min/1.73m2) ((L)) 21 11/19/2019 ((L)) 24 4/30/2019 > OR = 60 -    Calcium Level (mg/dL)  9.6 11/19/2019  9.6 11/19/2019 8.6 - 10.3   Calcium Level (mg/dL)  9.6 11/19/2019  9.6 11/19/2019 8.6 - 10.3   Phosphorus Level (mg/dL)  4.1 11/19/2019 ((H)) 4.4 4/30/2019 2.1 - 4.3   PTH Intact (pg/mL) ((H)) 133 11/19/2019 ((H)) 120 4/30/2019 14 - 64   Albumin Level (gm/dL)  4.5 11/19/2019  4.2 4/30/2019 3.6 - 5.1   U Microalbumin (mg/dL)  9.1 11/19/2019  See Note: -    Ur Creatinine (mg/dL)  94 11/19/2019  57 4/30/2019 20 - 320   Ur Microalbumin/Creatinine Ratio ((H)) 97 11/19/2019   - <30   Hgb (gm/dL) ((L)) 12.9 11/19/2019 ((L)) 12.5 4/30/2019 13.2 - 17.1       Please contact me or my assistant at [phone] if you have any questions or concerns.     Sincerely,        Chuck Anderson MD    What do your labs mean?  Below is a glossary of commonly ordered labs:  LDL - Bad Cholesterol  HDL -  Good Cholesterol  AST/ALT - Liver Function  Cr/Creatinine - Kidney Function  Microalbumin - Kidney Function  BUN - Kidney Function  PSA - Prostate   TSH - Thyroid Hormone  HgbA1c - Diabetes Test  Hgb (Hemoglobin) - Red Blood Cells

## 2022-02-15 NOTE — LETTER
(Inserted Image. Unable to display)   May 02, 2019      LARRY BEHRENS  [address] [place] [id number]        Dear REDDY,     Thank you for selecting [facility] ([facility] for your healthcare needs. Below you will find the results of your recent test(s) done at our clinic.      Labs are stable. I think 6 month follow-up plan would be reasonable.       Result Name Current Result Previous Result Reference Range   Sodium Level (mmol/L)  140 4/30/2019  139 12/14/2018 135 - 146   Potassium Level (mmol/L)  4.2 4/30/2019  3.8 12/14/2018 3.5 - 5.3   Chloride Level (mmol/L)  105 4/30/2019  106 12/14/2018 98 - 110   CO2 Level (mmol/L)  23 4/30/2019  23 12/14/2018 20 - 32   Glucose Level (mg/dL)  88 4/30/2019  88 12/14/2018 65 - 99   BUN (mg/dL) ((H)) 51 4/30/2019 ((H)) 41 12/14/2018 7 - 25   Creatinine Level (mg/dL) ((H)) 2.96 4/30/2019 ((H)) 2.52 12/14/2018 0.70 - 1.25   BUN/Creat Ratio  17 4/30/2019  16 12/14/2018 6 - 22   eGFR (mL/min/1.73m2) ((L)) 21 4/30/2019 ((L)) 25 12/14/2018 > OR = 60 -    eGFR  (mL/min/1.73m2) ((L)) 24 4/30/2019 ((L)) 29 12/14/2018 > OR = 60 -    Calcium Level (mg/dL)  9.4 4/30/2019  9.4 4/30/2019 8.6 - 10.3   Calcium Level (mg/dL)  9.4 4/30/2019  9.4 4/30/2019 8.6 - 10.3   Phosphorus Level (mg/dL) ((H)) 4.4 4/30/2019  3.6 12/14/2018 2.1 - 4.3   PTH Intact (pg/mL) ((H)) 120 4/30/2019 ((H)) 92 12/14/2018 14 - 64   Albumin Level (gm/dL)  4.2 4/30/2019  4.3 8/9/2018 3.6 - 5.1   U Protein (mg/dL) ((H)) 50 4/30/2019 ((H)) 61 12/14/2018 5 - 25   U Protein/Creatinine Ratio ((H)) 877 4/30/2019 ((H)) 924 12/14/2018 22 - 128   Ur Creatinine (mg/dL)  57 4/30/2019  66 12/14/2018 20 - 320   Hgb (gm/dL) ((L)) 12.5 4/30/2019 ((L)) 12.5 12/14/2018 13.2 - 17.1       Please contact me or my assistant at [phone] if you have any questions or concerns.     Sincerely,        Chuck Anderson MD    What do your labs mean?  Below is a glossary of commonly  ordered labs:  LDL - Bad Cholesterol  HDL - Good Cholesterol  AST/ALT - Liver Function  Cr/Creatinine - Kidney Function  Microalbumin - Kidney Function  BUN - Kidney Function  PSA - Prostate   TSH - Thyroid Hormone  HgbA1c - Diabetes Test  Hgb (Hemoglobin) - Red Blood Cells

## 2022-02-15 NOTE — PROGRESS NOTES
Patient:   BEHRENS, LARRY L            MRN: [id number]            FIN: [id number]               Age:   68 years     Sex:  Male     :  1950   Associated Diagnoses:   Cerebrovascular Accident (Stroke); HTN (Hypertension); Chronic Kidney Disease (CKD), Stage IV (Severe)   Author:   Chuck Anderson MD      Visit Information      Date of Service: 2018 01:14 pm  Performing Location: [facility]  Encounter#: [id number]      Primary Care Provider (PCP):  Chuck Anderson MD    NPI# [id number]      Referring Provider:  Chuck Anderson MD, NPI# [id number]   Accompanied by:  Spouse.    Source of history:  Self, Spouse.       Chief Complaint   3/30/2018 1:23 PM CDT    f/u CKD              Additional Information:No additional information recorded during visit.   Chief complaint and symptoms as noted above and confirmed with patient      History of Present Illness   10/14/2013: Mr. Behrens is a 62 yo referred by Dr. Vance  to renal clinic for evaluation of CKD, stage IV.  He has previously beened evaluated by Dr. Dockery via [facility] nephrology group, last seen in .  He has previously undergone extensive renal work-up including renal U/S showing no evidence of macrovascular disease and extensive intraparenchymal disease.  Previous urine immunofization studies negative.  Serological work-up previously negative.  His most recent SCr was 3.3 in August, eGFR ~18mL/min.  He and his wife say they've had some discussion with previous nephrologist about renal replacement therapy, though still unclear about what option is best for them.  Denies any uremic symptoms.  His peripheral edema has been well controlled on metolazone as diuretic monotherapy.  Blood pressure well controlled.  Currently without insurance and working on both coverage options.     10/13/2017: Presents with 4 day history of unilateral left-sided lower extremity swelling and some noticed purpuric features.  His wife is concerned about him having  cellulitis.  The leg does not hurt.  No recent injury.  No history of venous thromboembolism.  Remote history of stroke with left-sided hemiparesis.    3/30/2018: Juan returns for follow-up related to his chronic kidney disease and hypertension.  They generally padded fairly uneventful winter.  No acute health events or hospitalizations.  Generally feeling well.  Tolerating medications without issues.  No issues with lower extremity swelling.  They are trying to abide by a low-salt diet.         Review of Systems   Constitutional:  No fever, No chills.    Eye   Ear/Nose/Mouth/Throat:  No nasal congestion.    Respiratory:  No shortness of breath, No cough.    Cardiovascular:  No palpitations, No peripheral edema, No syncope.    Gastrointestinal:  No nausea, No vomiting, No abdominal pain.    Genitourinary:  No dysuria, No hematuria.    Hematology/Lymphatics:  Negative except as documented in history of present illness.    Endocrine:  No excessive thirst, No polyuria.    Immunologic:  No recurrent fevers.    Musculoskeletal:  No joint pain, No muscle pain.    Neurologic:  Alert and oriented X4, No numbness, No tingling, No headache.       Health Status   Allergies:    Allergic Reactions (Selected)  Severity Not Documented  Penicillin (No reactions were documented)   Medications:  (Selected)   Prescriptions  Prescribed  Lasix 40 mg oral tablet: 1 tab(s) ( 40 mg ), PO, Daily, # 90 tab(s), 3 Refill(s), Type: Maintenance, Pharmacy: [facility], 1 tab(s) po daily  amLODIPine 10 mg oral tablet: 1 tab(s) ( 10 mg ), po, daily, # 90 tab(s), 2 Refill(s), Type: Maintenance, Pharmacy: [facility]  hydrALAZINE 100 mg oral tablet: 1 tab(s) ( 100 mg ), PO, BID, # 270 tab(s), 3 Refill(s), Type: Maintenance, Pharmacy: [facility], 1 tab(s) po bid  left foot and ankle orthosis: left foot and ankle orthosis, See Instructions, Instructions: use as directed, # 1 EA, 0 Refill(s), Type: Maintenance  melatonin 3 mg oral  tablet: 1 tab(s) ( 3 mg ), po, qhs, # 90 tab(s), 3 Refill(s), Type: Maintenance, Pharmacy: [facility], 1 tab(s) po qhs  metoprolol succinate 100 mg oral tablet, extended release: 1 tab(s) ( 100 mg ), PO, Daily, # 90 tab(s), 3 Refill(s), Type: Maintenance, Pharmacy: [facility], 1 tab(s) po daily  nitroglycerin 0.4 mg sublingual tablet: 1 tab(s) ( 0.4 mg ), SL, q5min, PRN: for chest pain, # 25 tab(s), 1 Refill(s), Type: Maintenance, Pharmacy: [facility], KEEP ON FILE AND PT WILL NOTFY WHEN NEEDED, 1 tab(s) sl q5 min,PRN:for chest pain  simvastatin 20 mg oral tablet: 1 tab(s) ( 20 mg ), po, hs, # 90 tab(s), 3 Refill(s), Type: Maintenance, Pharmacy: [facility], 1 tab(s) po hs  tamsulosin 0.4 mg oral capsule: 1 cap(s) ( 0.4 mg ), PO, Daily, # 90 cap(s), 3 Refill(s), Type: Maintenance, Pharmacy: [facility], 1 cap(s) po daily  Documented Medications  Documented  Senna: po, hs, PRN: as needed for constipation, 0 Refill(s), Type: Maintenance  aspirin 325 mg oral tablet: 1 tab(s) ( 325 mg ), po, daily, 0 Refill(s), Type: Maintenance   Problem list:    All Problems  CKD (chronic kidney disease) stage 4, GFR 15-29 ml/min / SNOMED CT [id number] / Confirmed  Cerebrovascular accident (stroke) / SNOMED CT 312120524 / Confirmed  History of tobacco use / ICD-9-CM V15.82 / Probable  Hypertensive kidney disease with chronic kidney disease stage IV / SNOMED CT 37400649 / Confirmed  Left hemiplegia / SNOMED CT 472438309 / Confirmed  Resolved: *Hospitalized@[facility] - Chest pains  Resolved: *Hospitalized@[facility] - Weakness, generalized  Resolved: Acute on chronic renal failure / SNOMED CT 0166993327  Resolved: No pertinent past medical history / ICD-9-CM V49.9      Histories   Past Medical History:    Active  Hypertensive kidney disease with chronic kidney disease stage IV (94746801)  Cerebrovascular accident (stroke) (944689267)  Left hemiplegia (164309394)  Resolved  *Hospitalized@[facility] -  Chest pains: Onset on 4/6/2014 at 64 years.  Resolved on 4/7/2014 at 64 years.  *Hospitalized@[facility] - Weakness, generalized: Onset on 9/25/2012 at 62 years.  Resolved.  No pertinent past medical history (V49.9):  Resolved.  Comments:  9/25/2012 CDT 11:55 AM CDT - Barry Vuong MD  no regular health care  Acute on chronic renal failure (9279342939):  Resolved.   Family History:    Cancer  Mother  Father     Procedure history:    Appendectomy (901411101).  History of elbow surgery ([id number]).   Social History:        Alcohol Assessment: High Risk            Past      Tobacco Assessment            Past, Cigarettes, 10 per day.  30 year(s).  Total pack years: 50.      Substance Abuse Assessment: Denies Substance Abuse      Employment and Education Assessment            Retired      Home and Environment Assessment            Marital status: .  Spouse/Partner name: Sidra.  Chris children.                     Comments:                      09/25/2012 - Barry Vuong MD                     no insurance      Exercise and Physical Activity Assessment            Exercise frequency: 5 times per week.  Exercise type: stationary bike.        Physical Examination   vital signs stable, as noted above   Vital Signs   3/30/2018 1:23 PM CDT Temperature Tympanic 97 DegF  LOW    Peripheral Pulse Rate 71 bpm    Systolic Blood Pressure 156 mmHg  HI    Diastolic Blood Pressure 82 mmHg  HI    Mean Arterial Pressure 107 mmHg    BP Site Right arm    BP Systolic Repeat 150 mmHg    BP Diastolic Repeat 83 mmHg    BP Site Repeat Right arm      Measurements from flowsheet : Measurements   3/30/2018 1:23 PM CDT    Weight Measured - Standard                196.8 lb     General:  Alert and oriented, No acute distress.    Eye:  Extraocular movements are intact.    HENT:  Normocephalic, Tympanic membranes are clear, Oral mucosa is moist.    Neck:  Supple.    Respiratory:  Lungs are clear to auscultation, Respirations are  non-labored.    Cardiovascular:  Normal rate, Regular rhythm, No murmur.    Gastrointestinal:  Soft, Non-tender, Non-distended, Normal bowel sounds, No organomegaly.    Musculoskeletal:  Normal range of motion, Normal strength, No tenderness, left sided hemiparesis with contractures.    Neurologic:  Alert, Oriented, Normal motor function, No focal deficits.    Cognition and Speech:  Oriented, word apraxia, some dysarthric speech.    Psychiatric:  Appropriate mood & affect.       Review / Management   Results review:  Lab results   3/23/2018 10:22 AM CDT Sodium Level 141 mmol/L    Potassium Level 3.5 mmol/L    Chloride Level 104 mmol/L    CO2 Level 24 mmol/L    Glucose Level 89 mg/dL    BUN 42 mg/dL  HI    Creatinine 2.83 mg/dL  HI    BUN/Creat Ratio 15    eGFR 22 mL/min/1.73m2  LOW    eGFR African American 25 mL/min/1.73m2  LOW    Calcium Level 9.5 mg/dL    Calcium Level 9.5 mg/dL    Phosphorus Level 4.3 mg/dL    PTH Intact 131 pg/mL  HI    Albumin Level 4.6 gm/dL    Cholesterol 155 mg/dL    Non-    HDL 46 mg/dL    Chol/HDL Ratio 3.4    LDL 87    Triglyceride 123 mg/dL    U Protein 47 mg/dL  HI    U Protein/Creatinine Ratio 452  HI    Ur Creatinine 104 mg/dL    Hgb 14.3 gm/dL   .       Impression and Plan   Diagnosis     Cerebrovascular Accident (Stroke) (PCM00-QE I63.50).     HTN (Hypertension) (NMS90-IR I10).     Chronic Kidney Disease (CKD), Stage IV (Severe) (HCT52-MP N18.4).         .) CKD, stage IV - likely related to microvascular disease in setting of long-standing hypertension, tobaco use.      - baseline SCr of ~2.5-3.0; eGFR ~20mL/min, stable    - CO2 fine    - proteinuria slowly rising     - consider ACEi vs. ARB in the future if proteinuria >0.5g/g    - attended pre-dialysis education classes via [facility] [place] [facility] (6/2014). Tentative plans to begin CCPD when time to initiate comes    .) unilateral left sided edema; no cellulitic features on exam   - suspect neurogenic related edema; potential DVT?   - make  arrangements for venous duplex study   - assuming no DVT; I think we would benefit from home lymphedema wrapping    - Juan is homebound given related hemiparesis, immobility, and general frailty.  He requires assistance of both wife and another individual to transfer him from car.  I believe he would benefit from home PT/OT assessment    .) Hypertension, uncontrolled   current antihypertensive regimen: hydralazine 50mg TID, Toprol XL 100mg daily, furosemide 40mg daily, amlodipine 10mg daily  regimen changes: will increase hydralazine to 100mg TID  intolerance:  future titration/work-up plan:    - SBP goal <140     .) secondary hyperparathyroidism/ hyperphosphatemia    - phosphorus levels normal without binder therapy    - vitamin D stores replete    - not an active candidate for vitamin D analogues    .) Anemia of chronic renal disease   - Hgb 14.3   - currently not on ESAs   - follow serial Hgb and iron studies as appropriate    .) CVA   - worked with PT/OT   - discontinued baclofen because of sedation   - working with neuro in [place]    .) health maintenance   - declines all forms of age appropriate health screening   - PSA previously elevated at 8.1 as part of prostatitis work-up; Juan does not want to pursue this any further    - no targeted symptoms to suggestive advanced spread of disease    RTC q 4 months

## 2022-02-15 NOTE — PROGRESS NOTES
Patient:   BEHRENS, LARRY L            MRN: [id number]            FIN: [id number]               Age:   68 years     Sex:  Male     :  1950   Associated Diagnoses:   Cerebrovascular Accident (Stroke); HTN (Hypertension); CKD (chronic kidney disease) stage 4, GFR 15-29 ml/min   Author:   Chuck Anderson MD      Visit Information      Date of Service: 2018 02:00 pm  Performing Location: [facility]  Encounter#: [id number]      Primary Care Provider (PCP):  Chuck Anderson MD    NPI# [id number]      Referring Provider:  Chuck Anderson MD, NPI# [id number]   Accompanied by:  Spouse.    Source of history:  Self, Spouse.       Chief Complaint   2018 2:01 PM CST   CKD4 lab f/u              Additional Information:No additional information recorded during visit.   Chief complaint and symptoms as noted above and confirmed with patient      History of Present Illness   10/14/2013: Mr. Behrens is a 62 yo referred by Dr. Vance  to renal clinic for evaluation of CKD, stage IV.  He has previously beened evaluated by Dr. Dockery via [facility] nephrology group, last seen in .  He has previously undergone extensive renal work-up including renal U/S showing no evidence of macrovascular disease and extensive intraparenchymal disease.  Previous urine immunofization studies negative.  Serological work-up previously negative.  His most recent SCr was 3.3 in August, eGFR ~18mL/min.  He and his wife say they've had some discussion with previous nephrologist about renal replacement therapy, though still unclear about what option is best for them.  Denies any uremic symptoms.  His peripheral edema has been well controlled on metolazone as diuretic monotherapy.  Blood pressure well controlled.  Currently without insurance and working on both coverage options.     3/30/2018: Juan returns for follow-up related to his chronic kidney disease and hypertension.  They generally padded fairly uneventful winter.  No acute  health events or hospitalizations.  Generally feeling well.  Tolerating medications without issues.  No issues with lower extremity swelling.  They are trying to abide by a low-salt diet.    8/9/2018: Juan returns for follow-up related to his kidney disease.  Cory describes him appearing much more listless this past week.  His activity level is down substantially from where he had been when he has been having home therapy.  Now not getting up much.  She thinks he looks a little bit more labored with his breathing.  There is usual has no specific complaints or concerns.  Sidra has not noticed any other real localizing symptoms    12/14/2018: Juan returns to clinic for follow-up related to his chronic kidney disease.  Overall has been doing well.  No significant change in health since her last visit.  Tolerating blood pressure medicines without issues.  Previous complaints of fatigue earlier in the fall time have resolved.  He feels at his baseline state.         Review of Systems   Constitutional:  No fever, No chills, No weakness.    Eye   Ear/Nose/Mouth/Throat:  No nasal congestion.    Respiratory:  No shortness of breath, No cough.    Cardiovascular:  No palpitations, No peripheral edema, No syncope.    Gastrointestinal:  No nausea, No vomiting, No abdominal pain.    Genitourinary:  No dysuria, No hematuria.    Hematology/Lymphatics:  Negative except as documented in history of present illness.    Endocrine:  No excessive thirst, No polyuria.    Immunologic:  No recurrent fevers.    Musculoskeletal:  No joint pain, No muscle pain.    Neurologic:  Alert and oriented X4, No confusion, No numbness, No tingling, No headache.       Health Status   Allergies:    Allergic Reactions (Selected)  Severity Not Documented  Penicillin (No reactions were documented)   Medications:  (Selected)   Prescriptions  Prescribed  amLODIPine 10 mg oral tablet: 1 tab(s) ( 10 mg ), po, daily, # 90 tab(s), 2 Refill(s), Type:  Maintenance, Pharmacy: [facility]  furosemide 40 mg oral tablet: = 1 tab(s) ( 40 mg ), Oral, daily, # 90 tab(s), 0 Refill(s), Type: Maintenance, Pharmacy: [facility]  hydrALAZINE 100 mg oral tablet: 1 tab(s) ( 100 mg ), PO, BID, # 270 tab(s), 3 Refill(s), Type: Maintenance, Pharmacy: [facility], 1 tab(s) po bid  left foot and ankle orthosis: left foot and ankle orthosis, See Instructions, Instructions: use as directed, # 1 EA, 0 Refill(s), Type: Maintenance  melatonin 3 mg oral tablet: 1 tab(s) ( 3 mg ), po, qhs, # 90 tab(s), 3 Refill(s), Type: Maintenance, Pharmacy: [facility], 1 tab(s) po qhs  metoprolol succinate 100 mg oral tablet, extended release: 1 tab(s) ( 100 mg ), PO, Daily, # 90 tab(s), 3 Refill(s), Type: Maintenance, Pharmacy: [facility], 1 tab(s) po daily  nitroglycerin 0.4 mg sublingual tablet: 1 tab(s) ( 0.4 mg ), SL, q5min, PRN: for chest pain, # 25 tab(s), 1 Refill(s), Type: Maintenance, Pharmacy: [facility], KEEP ON FILE AND PT WILL NOTFY WHEN NEEDED, 1 tab(s) sl q5 min,PRN:for chest pain  simvastatin 20 mg oral tablet: = 1 tab(s) ( 20 mg ), Oral, hs, # 90 tab(s), 0 Refill(s), Type: Maintenance, Pharmacy: [facility]  tamsulosin 0.4 mg oral capsule: 1 cap(s) ( 0.4 mg ), PO, Daily, # 90 cap(s), 3 Refill(s), Type: Maintenance, Pharmacy: [facility], 1 cap(s) po daily  Documented Medications  Documented  Senna: po, hs, PRN: as needed for constipation, 0 Refill(s), Type: Maintenance  aspirin 325 mg oral tablet: 1 tab(s) ( 325 mg ), po, daily, 0 Refill(s), Type: Maintenance,    Medications          *denotes recorded medication          left foot and ankle orthosis: See Instructions, use as directed, 1 EA.          amLODIPine 10 mg oral tablet: 10 mg, 1 tab(s), po, daily, 90 tab(s), 2 Refill(s).          *aspirin 325 mg oral tablet: 325 mg, 1 tab(s), po, daily.          furosemide 40 mg oral tablet: 40 mg, 1 tab(s), Oral, daily, 90 tab(s), 0 Refill(s).           hydrALAZINE 100 mg oral tablet: 100 mg, 1 tab(s), PO, BID, 270 tab(s), 3 Refill(s).          melatonin 3 mg oral tablet: 3 mg, 1 tab(s), po, qhs, 90 tab(s), 3 Refill(s).          metoprolol succinate 100 mg oral tablet, extended release: 100 mg, 1 tab(s), PO, Daily, 90 tab(s), 3 Refill(s).          nitroglycerin 0.4 mg sublingual tablet: 0.4 mg, 1 tab(s), SL, q5min, PRN: for chest pain, 25 tab(s), 1 Refill(s).          *Senna: po, hs, PRN: as needed for constipation.          simvastatin 20 mg oral tablet: 20 mg, 1 tab(s), Oral, hs, 90 tab(s), 0 Refill(s).          tamsulosin 0.4 mg oral capsule: 0.4 mg, 1 cap(s), PO, Daily, 90 cap(s), 3 Refill(s).     Problem list:    All Problems  CKD (chronic kidney disease) stage 4, GFR 15-29 ml/min / SNOMED CT [id number] / Confirmed  Cerebrovascular accident (stroke) / SNOMED CT 411329627 / Confirmed  History of tobacco use / ICD-9-CM V15.82 / Probable  Hypertensive kidney disease with chronic kidney disease stage IV / SNOMED CT 20983357 / Confirmed  Left hemiplegia / SNOMED CT 018467612 / Confirmed  Resolved: *Hospitalized@[facility] - Chest pains  Resolved: *Hospitalized@[facility] - Weakness, generalized  Resolved: Acute on chronic renal failure / SNOMED CT 7233195395  Resolved: No pertinent past medical history / ICD-9-CM V49.9      Histories   Past Medical History:    Active  Hypertensive kidney disease with chronic kidney disease stage IV (60331443)  Cerebrovascular accident (stroke) (671725177)  Left hemiplegia (044071541)  Resolved  *Hospitalized@[facility] - Chest pains: Onset on 4/6/2014 at 64 years.  Resolved on 4/7/2014 at 64 years.  *Hospitalized@[facility] - Weakness, generalized: Onset on 9/25/2012 at 62 years.  Resolved.  No pertinent past medical history (V49.9):  Resolved.  Comments:  9/25/2012 CDT 11:55 AM CDT - Barry Vuong MD  no regular health care  Acute on chronic renal failure (9520986298):  Resolved.   Family History:    Cancer  Mother  Father      Procedure history:    Appendectomy (689302806).  History of elbow surgery ([id number]).   Social History:        Alcohol Assessment            Past      Tobacco Assessment            Past, Cigarettes, 10 per day.  30 year(s).  Total pack years: 50.      Employment and Education Assessment            Retired      Home and Environment Assessment            Marital status: .  Spouse/Partner name: Sidra.  Chris children.                     Comments:                      09/25/2012 - Barry Vuong MD                     no insurance      Exercise and Physical Activity Assessment            Exercise frequency: 5 times per week.  Exercise type: stationary bike.      Physical Examination   vital signs stable, as noted above   Vital Signs   12/14/2018 2:01 PM CST Temperature Tympanic 97.1 DegF  LOW    Peripheral Pulse Rate 61 bpm    Systolic Blood Pressure 130 mmHg    Diastolic Blood Pressure 76 mmHg    Mean Arterial Pressure 94 mmHg    BP Site Right arm    BP Method Manual    Oxygen Saturation 99 %      Measurements from flowsheet : Measurements   12/14/2018 2:01 PM CST   Height/Length Estimated   70.5 in     General:  Alert and oriented, No acute distress.    Eye:  Pupils are equal, round and reactive to light, Extraocular movements are intact.    HENT:  Normocephalic, Tympanic membranes are clear, Oral mucosa is moist.    Neck:  Supple.    Respiratory:  Lungs are clear to auscultation, Respirations are non-labored.    Cardiovascular:  Normal rate, Regular rhythm, No murmur.    Gastrointestinal:  Soft, Non-tender, Non-distended, Normal bowel sounds, No organomegaly.    Genitourinary:  No costovertebral angle tenderness.    Musculoskeletal:  Normal range of motion, Normal strength, No tenderness, left sided hemiparesis with contractures.    Neurologic:  Alert, Oriented, Normal motor function.    Cognition and Speech:  Oriented, Functional cognition intact, word apraxia, some dysarthric  speech.    Psychiatric:  Appropriate mood & affect.       Review / Management   Results review      Impression and Plan   Diagnosis     Cerebrovascular Accident (Stroke) (QXA82-ID I63.50).     HTN (Hypertension) (SDQ37-NQ I10).     CKD (chronic kidney disease) stage 4, GFR 15-29 ml/min (JNL68-KT N18.4).         .) CKD, stage IV - likely related to microvascular disease in setting of long-standing hypertension, tobaco use.      - baseline SCr of ~2.5-3.0; eGFR ~20mL/min, stable    - CO2 fine    - proteinuria slowly rising     - consider ACEi vs. ARB in the future    - attended pre-dialysis education classes via [facility] [place] (6/2014). Tentative plans to begin CCPD when time to initiate comes    .) Hypertension, controlled   current antihypertensive regimen: hydralazine 100mg TID, Toprol XL 100mg daily, furosemide 40mg daily, amlodipine 10mg daily  regimen changes: none  intolerance:  future titration/work-up plan:    - SBP goal <140     .) secondary hyperparathyroidism/ hyperphosphatemia    - phosphorus levels normal without binder therapy    - vitamin D stores replete    - not an active candidate for vitamin D analogues    .) Anemia of chronic renal disease   - Hgb 13.3   - currently not on ESAs   - follow serial Hgb and iron studies as appropriate    .) CVA   - discontinued baclofen because of sedation   - working with neuro in [place]    .) health maintenance   - declines all forms of age appropriate health screening   - PSA previously elevated at 8.1 as part of prostatitis work-up; Juan does not want to pursue this any further    - no targeted symptoms to suggestive advanced spread of disease    RTC q 4 months

## 2022-02-15 NOTE — CARE COORDINATION
Faxed order to [facility]  for skilled nursing assessment/PT/OT, med list, demo sheet, and [facility] H&P    RBreslin CMA

## 2022-02-15 NOTE — LETTER
(Inserted Image. Unable to display)         October 29, 2020        LARRY BEHRENS  [address] [place] [id number]        Dear REDDY,    Thank you for selecting [facility] for your healthcare needs.    Our records indicate you are due for the following services:     Hypertension check ~ Please remember to bring your at-home blood pressure readings with you to your appointment.     Kidney Disease Follow Up     Non-Fasting Labs    Urine labs ~ Please be prepared to leave a urine specimen for evaluation     If you had your labs done at another facility or with Direct Access Lab Testing at [facility], please bring in a copy of the results to your next visit, mail a copy, or drop off a copy of your results to your Healthcare Provider.     (FYI   Regarding office visits: In some instances, a video visit or telephone visit may be offered as an option.)    To schedule an appointment or if you have further questions, please contact your clinic at [phone].    Powered by [employer]    Sincerely,    Chuck Anderson MD

## 2022-02-15 NOTE — TELEPHONE ENCOUNTER
---------------------  From: Khloe GEORGES Lili LUCERO (Phone Messages Pool ([id number]_[facility]))   To: [facility] Message Pool ([id number]_[place]);     Sent: 7/22/2020 9:49:33 AM CDT  Subject: General Message     Phone Message    PCP:   ERIN      Time of Call:  9:22am       Person Calling:  pt wife Sidra  Phone number:  [phone]    Returned call at: 9:40am    Note:   Sidra left message stating she needs a call back because pt is not doing well. She says she anticipates having to call an ambulance to get pt in.    Returned call. Sidra says pt is not feeling good. BP is about 159/58 (per sidra usually 120-130 systolic). Pt also having swelling in feet/ankles.    Sidra says pt is complaining that his butt is hurting but he will not move so she is not sure what that is about. Pt usually sleeps in a chair with his legs up but has not been able to do that because it has been hurting him so she assumes this is causing the feet/ankle swelling.    Sidra says she has not been able to get pt up yesterday to get him changed.    Pt does have some labored breathing- she says it improves when she rubs his back. iSdra says it is hard for pt to catch his breath. No other signs of illness. She says pt's breathing problems are not new but are worse than normal.    Told Sidra if breathing seems to be worsening she should call ambulance if she is unable to transport him to ED.    Sidra says pt has finally fallen asleep and wonders if it is ok to wait for now.    Last office visit and reason:  11/19/19 CKD4, HTN---------------------  From: Larissa Rae CMA ([facility] Message Pool ([id number]_[facility]))   To: Chuck Anderson MD;     Sent: 7/22/2020 9:59:18 AM CDT  Subject: FW: General Message---------------------  From: Chuck Anderson MD   To: [facility] Message Pool ([id number]_[place]);     Sent: 7/22/2020 10:00:59 AM CDT  Subject: RE: General Message     If same concerns persist, I think best to have him come to ED via ambulanceTalked to  Sidra at 1002 and advised ER/ambulance and she agrees to this.  She knows she can't transfer him alone and knows he's better served through hospital.  She will call for ambulance

## 2022-02-15 NOTE — NURSING NOTE
Comprehensive Intake Entered On:  11/19/2019 2:36 PM CST    Performed On:  11/19/2019 2:34 PM CST by Larissa Rae CMA               Summary   Chief Complaint :   f/u CKD    Ht/Wt Measurement Refused by Patient? :   Yes   Systolic Blood Pressure :   122 mmHg   Diastolic Blood Pressure :   76 mmHg   Mean Arterial Pressure :   91 mmHg   Peripheral Pulse Rate :   72 bpm   BP Site :   Right arm   Temperature Tympanic :   96.2 DegF(Converted to: 35.7 DegC)  (LOW)    Race :      Languages :   English   Ethnicity :   Not  or    Larissa Rae CMA - 11/19/2019 2:34 PM CST   Health Status   Allergies Verified? :   Yes   Medication History Verified? :   Yes   Medical History Verified? :   Yes   Pre-Visit Planning Status :   Completed   Tobacco Use? :   Former smoker   Larissa Rae CMA - 11/19/2019 2:34 PM CST   Social History   Social History   (As Of: 11/19/2019 2:36:17 PM CST)   Alcohol:        Past   (Last Updated: 4/11/2017 8:33:16 AM CDT by Phoebe Mckeon)          Tobacco:        Past, Cigarettes, 10 per day.  30 year(s).  Total pack years: 50.   (Last Updated: 12/12/2012 12:11:24 PM CST by Ru Small RN)          Employment/School:        Retired   (Last Updated: 9/26/2012 1:11:22 PM CDT by Larissa Rae CMA)          Home/Environment:        Marital status: .  Spouse/Partner name: Manisha Perez children.   Comments:  9/25/2012 11:56 AM - Barry Vuong MD: no insurance   (Last Updated: 9/26/2012 1:11:34 PM CDT by Larissa Rae CMA)          Exercise:        Exercise frequency: 5 times per week.  Exercise type: stationary bike.   (Last Updated: 9/26/2012 1:13:07 PM CDT by Larissa Rae CMA)

## 2022-02-15 NOTE — TELEPHONE ENCOUNTER
Entered by Patrica Mckenna CMA on July 31, 2019 11:06:10 AM CDT  ---------------------  From: Patrica Mckenna CMA   To: [facility]    Sent: 7/31/2019 11:06:10 AM CDT  Subject: Medication Management     ** Submitted: **  Order:metoprolol (Metoprolol Succinate  mg oral tablet, extended release)  1 tab(s)  Oral  daily  Qty:  90 tab(s)        Refills:  0          Substitutions Allowed     Route To Pharmacy - [facility]    Signed by Patrica Mckenna CMA  7/31/2019 11:05:00 AM    ** Submitted: **  Complete:metoprolol (metoprolol succinate 100 mg oral tablet, extended release)   Signed by Patrica Mckenna CMA  7/31/2019 11:06:00 AM    ** Not Approved:  **  metoprolol (METOPROLOL SUCCINATE ER 100MG TB24)  TAKE ONE TABLET BY MOUTH EVERY DAY  Qty:  60 tab(s)        Days Supply:  60        Refills:  3          Substitutions Allowed     Route To Pharmacy - [facility]   Signed by Ptarica Mckenna CMA            ------------------------------------------  From: [facility]  To: Chuck Anderson MD  Sent: July 30, 2019 11:26:53 AM CDT  Subject: Medication Management  Due: July 31, 2019 11:26:53 AM CDT    ** On Hold Pending Signature **  Drug: metoprolol (Toprol- mg oral tablet, extended release)  TAKE ONE TABLET BY MOUTH EVERY DAY  Quantity: 60 tab(s)     Days Supply: 60        Refills: 3  Substitutions Allowed  Notes from Pharmacy:     Dispensed Drug: metoprolol (Metoprolol Succinate  mg oral tablet, extended release)  TAKE ONE TABLET BY MOUTH EVERY DAY  Quantity: 60 tab(s)     Days Supply: 60        Refills: 3  Substitutions Allowed  Notes from Pharmacy:   ------------------------------------------Date of last office visit and reason:  4/30/19 CKD/HTN      Date of last Med Check / Px:   4/30/19  Date of last labs pertaining to med:  4/30/19    RTC order in chart:  yes, due 10/2019    For Protocol refill, has patient been contacted:  _

## 2022-02-15 NOTE — CARE COORDINATION
I spoke with Sidra patient's spouse regarding how to contact [facility] services.  Mailed the ADRC guide and gave her the telephone numger to contact the  to schedule an appointment. I also sent information regarding [facility] for future reference.   is helping with paper work for [facility].

## 2022-02-15 NOTE — NURSING NOTE
Anticoagulation Therapy Management Entered On:  7/31/2020 2:28 PM CDT    Performed On:  7/31/2020 2:25 PM CDT by Chantal Zarate RN               Anticoagulation Visit Assessment   Type of Visit - Anticoagulation :   Telephone   Anticoagulation Indication :   Atrial fibrillation   Anticoagulation Medication Verified :   Yes   Chantal Zarate RN - 7/31/2020 2:25 PM CDT   Anticoagulation Patient Assessment Grid   Change in Alcohol Consumption :   No   Change in Diet :   No   Change in Medications :   No   Diarrhea :   No   Rectal Bleeding :   No   Signs of Clotting :   No   Signs of Warfarin Intolerance :   No   Unusual Bleeding, Bruising :   No   Upcoming Procedures :   No   Vomiting :   No   Chantal Zarate RN - 7/31/2020 2:25 PM CDT   Patient on Warfarin :   Yes   Chantal Zarate RN - 7/31/2020 2:25 PM CDT   Warfarin   International Normalization Ratio TR :   7.6    Sunday :   7.5 mg   Monday :   0 mg   Tuesday :   5 mg   Wednesday :   7.5 mg   Thursday :   5 mg   Friday :   0 mg   Saturday :   0 mg   Total Dose :   25 mg   Warfarin Pt Reported Previous Week Dose :    Sun Mon Tues Wed Thurs Fri Sat Weekly Total Dose   Week 1                   Week 2                   Week 3                   Week 4                         Sunday :   0 mg   Monday :   5 mg   Tuesday :   5 mg   Wednesday :   5 mg   Thursday :   5 mg   Friday :   5 mg   Saturday :   5 mg   Week 1 Total Dose :   30 mg   Patient Instructions :   Fax received from [facility] with venous INR = 7.6. Per [facility] hold warfarin x 3 days, check INR on 8/3/2020. Take oral Vitamin K 2.5 mg today. Take 20mEq KCl daily x 5 days and recheck BMP in 2 weeks. Orders were given to JOSSELINE Jacobson at [facility] by phone and also faxed.     Chantal Zarate RN - 7/31/2020 2:25 PM CDT

## 2022-02-15 NOTE — TELEPHONE ENCOUNTER
Entered by Larissa Rae CMA on October 14, 2019 4:01:55 PM CDT  ---------------------  From: Larissa Rae CMA   To: [facility]    Sent: 10/14/2019 4:01:55 PM CDT  Subject: Medication Management     ** Submitted: **  Order:metoprolol (Metoprolol Succinate  mg oral tablet, extended release)  1 tab(s)  Oral  daily  Qty:  30 tab(s)        Refills:  0          Substitutions Allowed     Route To Pharmacy - [facility]    Signed by Larissa Rae CMA  10/14/2019 4:01:00 PM    ** Submitted: **  Complete:metoprolol (Metoprolol Succinate  mg oral tablet, extended release)   Signed by Larissa Rae CMA  10/14/2019 4:01:00 PM    ** Not Approved:  **  metoprolol (METOPROLOL SUCCINATE ER 100MG TB24)  TAKE ONE TABLET BY MOUTH EVERY DAY  Qty:  60 tab(s)        Days Supply:  60        Refills:  3          Substitutions Allowed     Route To Pharmacy - [facility]   Signed by Larissa Rae CMA            ------------------------------------------  From: [facility]  To: Chuck Anderson MD  Sent: October 14, 2019 11:35:00 AM CDT  Subject: Medication Management  Due: October 15, 2019 11:35:00 AM CDT    ** On Hold Pending Signature **  Drug: metoprolol (Toprol- mg oral tablet, extended release)  TAKE ONE TABLET BY MOUTH EVERY DAY  Quantity: 60 tab(s)  Days Supply: 60  Refills: 3  Substitutions Allowed  Notes from Pharmacy:     Dispensed Drug: metoprolol (Metoprolol Succinate  mg oral tablet, extended release)  TAKE ONE TABLET BY MOUTH EVERY DAY  Quantity: 60 tab(s)  Days Supply: 60  Refills: 3  Substitutions Allowed  Notes from Pharmacy:   ------------------------------------------

## 2022-02-15 NOTE — TELEPHONE ENCOUNTER
---------------------  From: Larissa Rae CMA   Sent: 12/2/2020 12:27:02 PM CST  Subject: General Message- labs 12/7     cancelled today's appt due to not having labs completed  discussed with [facility] and Sidra    pt due for IRN 12/7 - asked for CKD 4 labs to be completed at that time - order faxed to [facility] and asked that they reschedule appt with [facility] after labs completed

## 2022-02-15 NOTE — NURSING NOTE
Anticoagulation Therapy Management Entered On:  9/9/2020 11:01 AM CDT    Performed On:  9/9/2020 11:00 AM CDT by Chantal Zarate RN               Anticoagulation Visit Assessment   Anticoagulation Indication :   Atrial fibrillation   Anticoagulant Duration :   Undetermined   Anticoagulation Medication Verified :   Yes   Chantal Zarate RN - 9/9/2020 11:00 AM CDT   Anticoagulation Patient Assessment Grid   Change in Alcohol Consumption :   No   Change in Diet :   No   Change in Medications :   No   Diarrhea :   No   Rectal Bleeding :   No   Signs of Clotting :   No   Signs of Warfarin Intolerance :   No   Unusual Bleeding, Bruising :   No   Upcoming Procedures :   No   Vomiting :   No   Chantal Zarate RN - 9/9/2020 11:00 AM CDT   Patient on Warfarin :   Yes   Chantal Zarate RN - 9/9/2020 11:00 AM CDT   Warfarin   International Normalization Ratio TR :   2.8    Anticoagulant INR Goal Lower :   2    Anticoagulant INR Goal Upper :   3    Sunday :   1.25 mg   Monday :   1.25 mg   Tuesday :   1.25 mg   Wednesday :   1.25 mg   Thursday :   1.25 mg   Friday :   1.25 mg   Saturday :   1.25 mg   Total Dose :   8.75 mg   Warfarin Pt Reported Previous Week Dose :    Sun Mon Tues Wed Thurs Fri Sat Weekly Total Dose   Week 1                   Week 2                   Week 3                   Week 4                         Patient is taking single or multiple strength tablet(s) :   Single strength tab(s)   One Tab Strength :   5 mg tab   Sunday :   1.25 mg   Monday :   1.25 mg   Tuesday :   1.25 mg   Wednesday :   1.25 mg   Thursday :   1.25 mg   Friday :   1.25 mg   Saturday :   1.25 mg   Week 1 Total Dose :   8.75 mg   Sunday :   0.25 tab(s)   Monday :   0.25 tab(s)   Tuesday :   0.25 tab(s)   Wednesday :   0.25 tab(s)   Thursday :   0.25 tab(s)   Friday :   0.25 tab(s)   Saturday :   0.25 tab(s)   Patient Instructions :   [facility] INR = 2.8; per protocol continue warfarin 1.25 mg daily; recheck INR on 9/14/20; faxed to [facility]      Elliot MANJARREZ, Chantal NEWMAN - 9/9/2020 11:00 AM CDT

## 2022-02-15 NOTE — TELEPHONE ENCOUNTER
---------------------  From: Chantal Zarate RN   To: Chuck Anderson MD;     Cc: INR Pool ( [id number]_[facility]);      Sent: 7/31/2020 11:05:47 AM CDT !  Subject: Critical INR     Fax received from [facility]. Discharged from [facility] for PAF    Venous INR = 7.6    Warfarin held 7/27 then took 5 mg, 7.5 mg, 5 mg.      Please adviseReceived call from Indira @ [facility] was critical INR of 7.6. Indira is wanting a call back with instructions. She stated that she did send a fax to INR nurses.Protocol suggests holding x 2-3 doses and POSSIBLE oral 2.5 mg Vit. K.Pt also had BMP done through [facility] lab. Can you look at results and advise on those as well please?---------------------  From: Chuck Anderson MD   To: Chantal Zarate RN;     Sent: 7/31/2020 1:08:51 PM CDT  Subject: RE: Critical INR     I would given vitamin K 2.5mg---------------------  From: Chuck Anderson MD   To: Chantal Zarate RN;     Sent: 7/31/2020 1:38:15 PM CDT  Subject: RE: Critical INR     I reviewed BMP findings.  He should be started on KCl 20mEq daily for 5 days only.  Repeat BMP in 2 weeksCall to [facility] at [phone]    Verbally informed nurse of [facility] orders. Will fax as well.    See anticoag charting.

## 2022-02-15 NOTE — PROGRESS NOTES
Patient:   BEHRENS, LARRY L            MRN: [id number]            FIN: [id number]               Age:   69 years     Sex:  Male     :  1950   Associated Diagnoses:   Cerebrovascular Accident (Stroke); HTN (Hypertension); CKD (chronic kidney disease) stage 4, GFR 15-29 ml/min   Author:   Chuck Anderson MD      Visit Information      Date of Service: 2019 01:40 pm  Performing Location: [facility]  Encounter#: [id number]      Primary Care Provider (PCP):  Chuck Anderson MD    NPI# [id number]      Referring Provider:  Chuck Anderson MD, NPI# [id number]   Accompanied by:  Spouse.    Source of history:  Self, Spouse.       Chief Complaint   2019 1:56 PM CDT    f/u CKD              Additional Information:No additional information recorded during visit.   Chief complaint and symptoms as noted above and confirmed with patient      History of Present Illness   10/14/2013: Mr. Behrens is a 62 yo referred by Dr. Vance  to renal clinic for evaluation of CKD, stage IV.  He has previously beened evaluated by Dr. Dockery via [facility] nephrology group, last seen in .  He has previously undergone extensive renal work-up including renal U/S showing no evidence of macrovascular disease and extensive intraparenchymal disease.  Previous urine immunofization studies negative.  Serological work-up previously negative.  His most recent SCr was 3.3 in August, eGFR ~18mL/min.  He and his wife say they've had some discussion with previous nephrologist about renal replacement therapy, though still unclear about what option is best for them.  Denies any uremic symptoms.  His peripheral edema has been well controlled on metolazone as diuretic monotherapy.  Blood pressure well controlled.  Currently without insurance and working on both coverage options.     2018: Juan returns for follow-up related to his kidney disease.  Cory describes him appearing much more listless this past week.  His activity level  is down substantially from where he had been when he has been having home therapy.  Now not getting up much.  She thinks he looks a little bit more labored with his breathing.  There is usual has no specific complaints or concerns.  Sidra has not noticed any other real localizing symptoms    12/14/2018: Juan returns to clinic for follow-up related to his chronic kidney disease.  Overall has been doing well.  No significant change in health since her last visit.  Tolerating blood pressure medicines without issues.  Previous complaints of fatigue earlier in the fall time have resolved.  He feels at his baseline state.    4/30/2019: Juan presents for follow-up related to his chronic kidney disease.  He has had a fairly uneventful winter.  No interval events since last visit.  Tolerating medications without issues.  No labs available before visit due to hardship of leaving the home.  No urinary tract symptoms.  Unclear on weight trends.  Weights approximately 10 pounds below historical weight though unclear unreliability of in clinic weights to his hemiparesis and balance issues.         Review of Systems   Constitutional:  No fever, No chills, No weakness.    Eye   Ear/Nose/Mouth/Throat:  No nasal congestion.    Respiratory:  No shortness of breath, No cough.    Cardiovascular:  No palpitations, No peripheral edema, No syncope.    Gastrointestinal:  No nausea, No vomiting, No abdominal pain.    Genitourinary:  No dysuria, No hematuria.    Hematology/Lymphatics:  Negative except as documented in history of present illness.    Endocrine:  No excessive thirst, No polyuria.    Immunologic:  No recurrent fevers.    Musculoskeletal:  No joint pain, No muscle pain.    Neurologic:  Alert and oriented X4, No confusion, No numbness, No tingling, No headache.       Health Status   Allergies:    Allergic Reactions (Selected)  Severity Not Documented  Penicillin (No reactions were documented)   Medications:  (Selected)    Prescriptions  Prescribed  amLODIPine 10 mg oral tablet: = 1 tab(s) ( 10 mg ), po, daily, # 90 tab(s), 3 Refill(s), Type: Maintenance, Pharmacy: [facility], 1 tab(s) Oral daily  furosemide 40 mg oral tablet: = 1 tab(s) ( 40 mg ), Oral, daily, # 90 tab(s), 3 Refill(s), Type: Maintenance, Pharmacy: [facility], 1 tab(s) Oral daily  hydrALAZINE 100 mg oral tablet: = 1 tab(s) ( 100 mg ), PO, BID, # 270 tab(s), 3 Refill(s), Type: Maintenance, Pharmacy: [facility], 1 tab(s) Oral bid  left foot and ankle orthosis: left foot and ankle orthosis, See Instructions, Instructions: use as directed, # 1 EA, 0 Refill(s), Type: Maintenance  melatonin 3 mg oral tablet: = 1 tab(s) ( 3 mg ), po, qhs, # 90 tab(s), 3 Refill(s), Type: Maintenance, Pharmacy: [facility], 1 tab(s) Oral qhs  metoprolol succinate 100 mg oral tablet, extended release: = 1 tab(s) ( 100 mg ), PO, Daily, # 90 tab(s), 3 Refill(s), Type: Maintenance, Pharmacy: [facility], 1 tab(s) Oral daily  nitroglycerin 0.4 mg sublingual tablet: 1 tab(s) ( 0.4 mg ), SL, q5min, PRN: for chest pain, # 25 tab(s), 1 Refill(s), Type: Maintenance, Pharmacy: [facility], KEEP ON FILE AND PT WILL NOTFY WHEN NEEDED, 1 tab(s) sl q5 min,PRN:for chest pain  simvastatin 20 mg oral tablet: = 1 tab(s) ( 20 mg ), Oral, hs, # 90 tab(s), 3 Refill(s), Type: Maintenance, Pharmacy: [facility], 1 tab(s) Oral hs  tamsulosin 0.4 mg oral capsule: = 1 cap(s) ( 0.4 mg ), PO, Daily, # 90 cap(s), 3 Refill(s), Type: Maintenance, Pharmacy: [facility], 1 cap(s) Oral daily  Documented Medications  Documented  Senna: po, hs, PRN: as needed for constipation, 0 Refill(s), Type: Maintenance  aspirin 325 mg oral tablet: 1 tab(s) ( 325 mg ), po, daily, 0 Refill(s), Type: Maintenance,    Medications          *denotes recorded medication          left foot and ankle orthosis: See Instructions, use as directed, 1 EA.          amLODIPine 10 mg oral tablet: 10 mg, 1 tab(s),  po, daily, 90 tab(s), 3 Refill(s).          *aspirin 325 mg oral tablet: 325 mg, 1 tab(s), po, daily.          furosemide 40 mg oral tablet: 40 mg, 1 tab(s), Oral, daily, 90 tab(s), 3 Refill(s).          hydrALAZINE 100 mg oral tablet: 100 mg, 1 tab(s), PO, BID, 270 tab(s), 3 Refill(s).          melatonin 3 mg oral tablet: 3 mg, 1 tab(s), po, qhs, 90 tab(s), 3 Refill(s).          metoprolol succinate 100 mg oral tablet, extended release: 100 mg, 1 tab(s), PO, Daily, 90 tab(s), 3 Refill(s).          nitroglycerin 0.4 mg sublingual tablet: 0.4 mg, 1 tab(s), SL, q5min, PRN: for chest pain, 25 tab(s), 1 Refill(s).          *Senna: po, hs, PRN: as needed for constipation.          simvastatin 20 mg oral tablet: 20 mg, 1 tab(s), Oral, hs, 90 tab(s), 3 Refill(s).          tamsulosin 0.4 mg oral capsule: 0.4 mg, 1 cap(s), PO, Daily, 90 cap(s), 3 Refill(s).     Problem list:    All Problems  CKD (chronic kidney disease) stage 4, GFR 15-29 ml/min / SNOMED CT [id number] / Confirmed  Cerebrovascular accident (stroke) / SNOMED CT 416196315 / Confirmed  History of tobacco use / ICD-9-CM V15.82 / Probable  Hypertensive kidney disease with chronic kidney disease stage IV / SNOMED CT 17023842 / Confirmed  Left hemiplegia / SNOMED CT 406750548 / Confirmed  Resolved: *Hospitalized@[facility] - Chest pains  Resolved: *Hospitalized@[facility] [place] - Weakness, generalized  Resolved: Acute on chronic renal failure / SNOMED CT 9702670647  Resolved: No pertinent past medical history / ICD-9-CM V49.9      Histories   Past Medical History:    Active  Hypertensive kidney disease with chronic kidney disease stage IV (74152771)  Cerebrovascular accident (stroke) (973116674)  Left hemiplegia (577591091)  Resolved  *Hospitalized@[facility] [place] - Chest pains: Onset on 4/6/2014 at 64 years.  Resolved on 4/7/2014 at 64 years.  *Hospitalized@[facility] [place] - Weakness, generalized: Onset on 9/25/2012 at 62 years.  Resolved.  No pertinent past medical history  (V49.9):  Resolved.  Comments:  9/25/2012 CDT 11:55 AM CDT - Barry Vuong MD  no regular health care  Acute on chronic renal failure (0117126288):  Resolved.   Family History:    Cancer  Mother  Father     Procedure history:    Appendectomy (681488740).  History of elbow surgery ([id number]).   Social History:        Alcohol Assessment            Past      Tobacco Assessment            Past, Cigarettes, 10 per day.  30 year(s).  Total pack years: 50.      Employment and Education Assessment            Retired      Home and Environment Assessment            Marital status: .  Spouse/Partner name: Manisha Perez children.                     Comments:                      09/25/2012 - Barry Vuong MD                     no insurance      Exercise and Physical Activity Assessment            Exercise frequency: 5 times per week.  Exercise type: stationary bike.      Physical Examination   vital signs stable, as noted above   Vital Signs   4/30/2019 1:56 PM CDT Temperature Tympanic 96.9 DegF  LOW    Peripheral Pulse Rate 67 bpm    Systolic Blood Pressure 155 mmHg  HI    Diastolic Blood Pressure 81 mmHg  HI    Mean Arterial Pressure 106 mmHg    BP Site Right arm    BP Systolic Repeat 153 mmHg    BP Diastolic Repeat 88 mmHg      Measurements from flowsheet : Measurements   4/30/2019 1:56 PM CDT    Weight Measured - Standard                183 lb     General:  Alert and oriented, No acute distress.    Eye:  Pupils are equal, round and reactive to light, Extraocular movements are intact.    HENT:  Normocephalic, Tympanic membranes are clear, Oral mucosa is moist.    Neck:  Supple.    Respiratory:  Lungs are clear to auscultation, Respirations are non-labored.    Cardiovascular:  Normal rate, Regular rhythm, No murmur.    Gastrointestinal:  Soft, Non-tender, Non-distended, Normal bowel sounds, No organomegaly.    Genitourinary:  No costovertebral angle tenderness.    Musculoskeletal:  Normal  range of motion, Normal strength, No tenderness, left sided hemiparesis with contractures.    Neurologic:  Alert, Oriented, Normal motor function.    Cognition and Speech:  Oriented, Functional cognition intact, word apraxia, some dysarthric speech.    Psychiatric:  Appropriate mood & affect.       Review / Management   Results review      Impression and Plan   Diagnosis     Cerebrovascular Accident (Stroke) (SCY09-BO I63.50).     HTN (Hypertension) (WGA35-JN I10).     CKD (chronic kidney disease) stage 4, GFR 15-29 ml/min (YDM96-WC N18.4).         .) CKD, stage 4 - likely related to microvascular disease in setting of long-standing hypertension, tobacco use.      - baseline SCr of ~2.5-3.0; eGFR ~20mL/min, stable    - CO2 fine    - proteinuria slowly rising     - consider ACEi vs. ARB in the future    - attended pre-dialysis education classes [place] [facility] ([place]). Tentative plans to begin [facility] when time to initiate comes    .) Hypertension, controlled (historically)   current antihypertensive regimen: hydralazine 100mg TID, Toprol XL 100mg daily, furosemide 40mg daily, amlodipine 10mg daily  regimen changes: none  intolerance:  future titration/work-up plan:    - SBP goal <140; would look at ARB as next adjunct    .) secondary hyperparathyroidism/ hyperphosphatemia    - phosphorus levels normal without binder therapy    - vitamin D stores replete    - not an active candidate for vitamin D analogues    .) Anemia of chronic renal disease   - Hgb 13.3   - currently not on ESAs   - follow serial Hgb and iron studies as appropriate    .) CVA   - discontinued baclofen because of sedation   - working with neuro in [place]    .) health maintenance   - declines all forms of age appropriate health screening   - PSA previously elevated at 8.1 as part of prostatitis work-up; Juan does not want to pursue this any further    - no targeted symptoms to suggest advanced spread of disease    Assuming labs stable from today,  then RTC q 6 months

## 2022-02-15 NOTE — NURSING NOTE
Comprehensive Intake Entered On:  4/30/2019 1:58 PM CDT    Performed On:  4/30/2019 1:56 PM CDT by Larissa Rae CMA               Summary   Chief Complaint :   f/u CKD   Weight Measured :   183 lb(Converted to: 183 lb 0 oz, 83.01 kg)    Systolic Blood Pressure :   155 mmHg (HI)    Diastolic Blood Pressure :   81 mmHg (HI)    Mean Arterial Pressure :   106 mmHg   Peripheral Pulse Rate :   67 bpm   BP Site :   Right arm   Temperature Tympanic :   96.9 DegF(Converted to: 36.1 DegC)  (LOW)    Race :      Languages :   English   Ethnicity :   Not  or    Larissa Rae CMA - 4/30/2019 1:56 PM CDT   Health Status   Allergies Verified? :   Yes   Medication History Verified? :   Yes   Medical History Verified? :   Yes   Pre-Visit Planning Status :   Completed   Tobacco Use? :   Former smoker   Larissa Rae CMA - 4/30/2019 1:56 PM CDT   Social History   Social History   (As Of: 4/30/2019 1:58:51 PM CDT)   Alcohol:        Past   (Last Updated: 4/11/2017 8:33:16 AM CDT by Phoebe Mckeon)          Tobacco:        Past, Cigarettes, 10 per day.  30 year(s).  Total pack years: 50.   (Last Updated: 12/12/2012 12:11:24 PM CST by Ru Small RN)          Employment and Education:        Retired   (Last Updated: 9/26/2012 1:11:22 PM CDT by Larissa Rae CMA)          Home and Environment:        Marital status: .  Spouse/Partner name: Manisha Perez children.   Comments:  9/25/2012 11:56 AM - Barry Vuong MD: no insurance   (Last Updated: 9/26/2012 1:11:34 PM CDT by Larissa Rae CMA)          Exercise and Physical Activity:        Exercise frequency: 5 times per week.  Exercise type: stationary bike.   (Last Updated: 9/26/2012 1:13:07 PM CDT by Larissa Rae CMA)            More Vitals   Systolic Blood Pressure Repeat :   153 mmHg   Diastolic Blood Pressure Repeat :   88 mmHg   Larissa Rae CMA - 4/30/2019 2:15 PM CDT

## 2022-02-15 NOTE — CARE COORDINATION
Pt appears on [facility] chronic disease panel as out of parameters for BP.  Pt seen 8/9/2018 /72, has RTC 12/2018, placed CSS BP  2 wks

## 2022-02-15 NOTE — CARE COORDINATION
Patient:   BEHRENS, LARRY L            MRN: [id number]            FIN: [id number]               Age:   70 years     Sex:  Male     :  1950   Associated Diagnoses:   None   Author:   Soni Mckeon CMA      Sources of Information:  [ ] Patient, family member, or caregiver (Please list):  [x ] Hospital discharge summary  [ ] Hospital fax  [ ] List of recent hospitalizations or ED visits  [ ] Other:     Discharged From: [facility]  Discharge Date: 20    Diagnosis/Problem: Weakness generalized    Medication Changes: [x ] Yes [ ] No   Medication List Updated: [ x] Yes [ ] No  Hospital medication list reconciled with clinic medication list: yes  Medications          *denotes recorded medication          left foot and ankle orthosis: See Instructions, use as directed, 1 EA.          amLODIPine 10 mg oral tablet: 10 mg, 1 tab(s), po, daily, 90 tab(s), 3 Refill(s).          *aspirin 81 mg oral tablet, chewable: 81 mg, 1 tab(s), Oral, daily.          furosemide 40 mg oral tablet: 40 mg, 1 tab(s), Oral, daily, 90 tab(s), 3 Refill(s).          hydrALAZINE 100 mg oral tablet: 100 mg, 1 tab(s), PO, BID, 270 tab(s), 3 Refill(s).          melatonin 3 mg oral tablet: 3 mg, 1 tab(s), po, qhs, 90 tab(s), 3 Refill(s).          Metoprolol Succinate  mg oral tablet, extended release: 1 tab(s), Oral, daily, 90 tab(s), 3 Refill(s).          nitroglycerin 0.4 mg sublingual tablet: 0.4 mg, 1 tab(s), SL, q5min, PRN: for chest pain, 25 tab(s), 1 Refill(s).          *Senna: po, hs, PRN: as needed for constipation.          simvastatin 20 mg oral tablet: 20 mg, 1 tab(s), Oral, hs, 90 tab(s), 3 Refill(s).          tamsulosin 0.4 mg oral capsule: 0.4 mg, 1 cap(s), PO, Daily, 90 cap(s), 3 Refill(s).          *warfarin 7.5 mg oral tablet: 7.5 mg, 1 tab(s), Oral, daily.      Needs Referral or Lab: [x ] Yes [ ] No  INR monitoring   Needs Follow-up Appointment:  [x ] Within 7 days of discharge (highly complex visit)  [ ] Within 14 days of  discharge (moderately complex visit)    Appointment Made With: N/A-can be seen on rounds  Date:    Pt d/c to [facility]-no CC f/u needed at this time.

## 2022-02-15 NOTE — PROGRESS NOTES
Patient:   BEHRENS, LARRY L            MRN: [id number]            FIN: [id number]               Age:   70 years     Sex:  Male     :  1950   Associated Diagnoses:   Atrial fibrillation; Cerebrovascular accident (stroke); CKD (chronic kidney disease) stage 4, GFR 15-29 ml/min; Left hemiplegia   Author:   Chuck Anderson MD      Visit Information      Date of Service: 2020 08:53 am  Performing Location: [facility]  Encounter#: [id number]      Primary Care Provider (PCP):  Chuck Anderson MD    NPI# [id number]      Referring Provider:  Chuck Anderson MD    NPI# [id number]   Visit type:  Telephone Encounter.    Source of history:  Patient.    Location of patient:  Home  Call Start Time:   1340  Call End Time:    _1355      Chief Complaint   2020 1:26 PM CDT    Telephone visit - post hosp f/u - discharged  to [facility] - per nurse at [facility] doing well -     _      History of Present Illness   Today's visit was conducted via telephone due to the COVID-19 pandemic. Patient's consent to telephone visit was obtained and documented.      Reason for visit:    I spoke with Juan via telephone call while residing at [facility].  Reviewed his hospital course which demonstrated newly diagnosed atrial fibrillation.  He was started on warfarin during his hospital stay.  Unclear timeframe about ultimately returning home.         Review of Systems   Constitutional:  Weakness, Fatigue, Decreased activity.    Neurologic:  Abnormal balance.       Impression and Plan   Diagnosis     Atrial fibrillation (ZMM85-CR I48.91).     Cerebrovascular accident (stroke) (DJT73-BB I63.9).     CKD (chronic kidney disease) stage 4, GFR 15-29 ml/min (XZQ35-IT N18.4).     Left hemiplegia (OQM20-WR G81.94).        Health Status   Allergies:    Allergic Reactions (Selected)  Severity Not Documented  Penicillin (Rash)   Medications:  (Selected)   Prescriptions  Prescribed  Metoprolol Succinate  mg oral tablet, extended release: =  1 tab(s), Oral, daily, # 90 tab(s), 3 Refill(s), Type: Maintenance, Pharmacy: [facility], appt 10/30, 1 tab(s) Oral daily  amLODIPine 10 mg oral tablet: = 1 tab(s) ( 10 mg ), po, daily, # 90 tab(s), 3 Refill(s), Type: Maintenance, Pharmacy: [facility], 1 tab(s) Oral daily  furosemide 40 mg oral tablet: = 1 tab(s) ( 40 mg ), Oral, daily, # 90 tab(s), 3 Refill(s), Type: Maintenance, Pharmacy: [facility], 1 tab(s) Oral daily  hydrALAZINE 100 mg oral tablet: = 1 tab(s) ( 100 mg ), PO, BID, # 270 tab(s), 3 Refill(s), Type: Maintenance, Pharmacy: [facility], 1 tab(s) Oral bid  left foot and ankle orthosis: left foot and ankle orthosis, See Instructions, Instructions: use as directed, # 1 EA, 0 Refill(s), Type: Maintenance  melatonin 3 mg oral tablet: = 1 tab(s) ( 3 mg ), po, qhs, # 90 tab(s), 3 Refill(s), Type: Maintenance, Pharmacy: [facility], 1 tab(s) Oral qhs  nitroglycerin 0.4 mg sublingual tablet: 1 tab(s) ( 0.4 mg ), SL, q5min, PRN: for chest pain, # 25 tab(s), 1 Refill(s), Type: Maintenance, Pharmacy: [facility], KEEP ON FILE AND PT WILL NOTFY WHEN NEEDED, 1 tab(s) sl q5 min,PRN:for chest pain  simvastatin 20 mg oral tablet: = 1 tab(s) ( 20 mg ), Oral, hs, # 90 tab(s), 3 Refill(s), Type: Maintenance, Pharmacy: [facility], 1 tab(s) Oral hs  tamsulosin 0.4 mg oral capsule: = 1 cap(s) ( 0.4 mg ), PO, Daily, # 90 cap(s), 3 Refill(s), Type: Maintenance, Pharmacy: [facility], 1 cap(s) Oral daily  Documented Medications  Documented  Senna: po, hs, PRN: as needed for constipation, 0 Refill(s), Type: Maintenance  aspirin 81 mg oral tablet, chewable: = 1 tab(s) ( 81 mg ), Oral, daily, Type: Maintenance  warfarin 7.5 mg oral tablet: See Instructions, Instructions: alternating 7.5, 5mg QOD, Type: Maintenance,    Medications          *denotes recorded medication          left foot and ankle orthosis: See Instructions, use as directed, 1 EA.          amLODIPine 10 mg oral tablet:  10 mg, 1 tab(s), po, daily, 90 tab(s), 3 Refill(s).          *aspirin 81 mg oral tablet, chewable: 81 mg, 1 tab(s), Oral, daily.          furosemide 40 mg oral tablet: 40 mg, 1 tab(s), Oral, daily, 90 tab(s), 3 Refill(s).          hydrALAZINE 100 mg oral tablet: 100 mg, 1 tab(s), PO, BID, 270 tab(s), 3 Refill(s).          melatonin 3 mg oral tablet: 3 mg, 1 tab(s), po, qhs, 90 tab(s), 3 Refill(s).          Metoprolol Succinate  mg oral tablet, extended release: 1 tab(s), Oral, daily, 90 tab(s), 3 Refill(s).          nitroglycerin 0.4 mg sublingual tablet: 0.4 mg, 1 tab(s), SL, q5min, PRN: for chest pain, 25 tab(s), 1 Refill(s).          *Senna: po, hs, PRN: as needed for constipation.          simvastatin 20 mg oral tablet: 20 mg, 1 tab(s), Oral, hs, 90 tab(s), 3 Refill(s).          tamsulosin 0.4 mg oral capsule: 0.4 mg, 1 cap(s), PO, Daily, 90 cap(s), 3 Refill(s).          *warfarin 7.5 mg oral tablet: See Instructions, alternating 7.5, 5mg QOD.       Problem list:    All Problems  CKD (chronic kidney disease) stage 4, GFR 15-29 ml/min / SNOMED CT [id number] / Confirmed  Cerebrovascular accident (stroke) / SNOMED CT 527074942 / Confirmed  History of tobacco use / ICD-9-CM V15.82 / Probable  Hypertensive kidney disease with chronic kidney disease stage IV / SNOMED CT 18480807 / Confirmed  Left hemiplegia / SNOMED CT 749084233 / Confirmed      Histories   Past Medical History:    Active  Hypertensive kidney disease with chronic kidney disease stage IV (71569437)  Cerebrovascular accident (stroke) (668448179)  Left hemiplegia (808706465)  Resolved  Inpatient stay (199975791): Onset on 7/22/2020 at 70 years.  Resolved on 7/24/2020 at 70 years.  Comments:  7/28/2020 CDT 2:45 PM CDT - [facility] [place] - Paroxysmal Afib; Generalized weakness.  *Hospitalized@[facility] - Chest pains: Onset on 4/6/2014 at 64 years.  Resolved on 4/7/2014 at 64 years.  *Hospitalized@[facility]  - Weakness, generalized: Onset on 9/25/2012 at 62 years.  Resolved.  No pertinent past medical history (V49.9):  Resolved.  Comments:  9/25/2012 CDT 11:55 AM CDT - Barry Vuong MD  no regular health care  Acute on chronic renal failure (6134500475):  Resolved.   Family History:    Cancer  Mother  Father     Procedure history:    Appendectomy (874043954).  History of elbow surgery ([id number]).   Social History:        Alcohol Assessment            Past      Tobacco Assessment            Past, Cigarettes, 10 per day.  30 year(s).  Total pack years: 50.      Employment and Education Assessment            Retired      Home and Environment Assessment            Marital status: .  Spouse/Partner name: Sidra.  0 children.                     Comments:                      09/25/2012 - Barry Vuong MD                     no insurance      Exercise and Physical Activity Assessment            Exercise frequency: 5 times per week.  Exercise type: stationary bike.        Review / Management     .) hospital f/u, generalized weakness with atrial fibrillation  discharge summary reviewed and meds reconciled   - high CHADSS score with h/o stroke along with CKD   - begun on warfarin (good candidate for home INR monitoring)  - currently at [facility] rehab - unclear timeframe on eventual return to home

## 2022-02-15 NOTE — NURSING NOTE
Anticoagulation Therapy Management Entered On:  8/11/2020 10:43 AM CDT    Performed On:  8/11/2020 10:41 AM CDT by Jayshree Meredith RN               Anticoagulation Visit Assessment   Type of Visit - Anticoagulation :   Telephone   Anticoagulation Indication :   Atrial fibrillation   Anticoagulation Medication Verified :   Yes   Jayshree Meredith RN - 8/11/2020 10:41 AM CDT   Anticoagulation Patient Assessment Grid   Change in Alcohol Consumption :   No   Change in Diet :   No   Change in Medications :   Yes   (Comment: holding warfarin since 7-31 [Jayshree Meredith RN 8/11/2020 10:41 AM CDT] )   Diarrhea :   No   Rectal Bleeding :   No   Signs of Clotting :   No   Signs of Warfarin Intolerance :   No   Unusual Bleeding, Bruising :   No   Upcoming Procedures :   No   Vomiting :   No   Jayshree Meredith RN - 8/11/2020 10:41 AM CDT   Patient on Warfarin :   Yes   Jayshree Meredith RN - 8/11/2020 10:41 AM CDT   Warfarin   Anticoagulant INR Goal Lower :   2    INR POC :   2.2    Anticoagulant INR Goal Upper :   3    Sunday :   0 mg   Monday :   0 mg   Tuesday :   0 mg   Wednesday :   0 mg   Thursday :   0 mg   Friday :   0 mg   Saturday :   0 mg   Total Dose :   0 mg   Warfarin Pt Reported Previous Week Dose :    Sun Mon Tues Wed Thurs Fri Sat Weekly Total Dose   Week 1                   Week 2                   Week 3                   Week 4                         Patient is taking single or multiple strength tablet(s) :   Single strength tab(s)   One Tab Strength :   5 mg tab   Sunday :   0 mg   Monday :   0 mg   Tuesday :   2.5 mg   Wednesday :   2.5 mg   Thursday :   2.5 mg   Friday :   0 mg   Saturday :   0 mg   Week 1 Total Dose :   7.5 mg   Sunday :   0 tab(s)   Monday :   0 tab(s)   Tuesday :   0.5 tab(s)   Wednesday :   0.5 tab(s)   Thursday :   0.5 tab(s)   Friday :   0 tab(s)   Saturday :   0 tab(s)   Patient Instructions :   [facility] INR= 2.2  Begin warfarin 2.5mg daily and recheck in 3 days.   Per [facility]. Faxed to tin      Viri MANJARREZ, Jayshree K - 8/11/2020 10:41 AM CDT

## 2022-02-15 NOTE — TELEPHONE ENCOUNTER
Entered by Nancy Jeffries CMA on January 16, 2020 10:59:09 AM CST  ---------------------  From: Nancy Jeffries CMA   To: [facility]    Sent: 1/16/2020 10:59:09 AM CST  Subject: Medication Management     ** Not Approved: Patient has requested refill too soon, #90, 3 refills sent 11/19/19 **  metoprolol (METOPROLOL SUCCINATE ER 100MG TB24)  TAKE ONE TABLET BY MOUTH EVERY DAY  Qty:  90 tab(s)        Days Supply:  90        Refills:  0          Substitutions Allowed     Route To Pharmacy - [facility]   Signed by Nancy Jeffries CMA            ------------------------------------------  From: [facility]  To: Chuck Anderson MD  Sent: January 16, 2020 10:05:00 AM CST  Subject: Medication Management  Due: January 17, 2020 10:05:00 AM CST    ** On Hold Pending Signature **  Drug: metoprolol (Metoprolol Succinate  mg oral tablet, extended release)  TAKE ONE TABLET BY MOUTH EVERY DAY  Quantity: 90 tab(s)  Days Supply: 90  Refills: 0  Substitutions Allowed  Notes from Pharmacy:     Dispensed Drug: metoprolol (Metoprolol Succinate  mg oral tablet, extended release)  TAKE ONE TABLET BY MOUTH EVERY DAY  Quantity: 90 tab(s)  Days Supply: 90  Refills: 0  Substitutions Allowed  Notes from Pharmacy:   ------------------------------------------

## 2022-02-15 NOTE — NURSING NOTE
Comprehensive Intake Entered On:  7/28/2020 1:31 PM CDT    Performed On:  7/28/2020 1:26 PM CDT by Larissa Rae CMA               Summary   Chief Complaint :   Telephone visit - post hosp f/u - discharged 7/24 to [facility] - per nurse at [facility] doing well -    Height/Length Estimated :   70.5 in(Converted to: 5 ft 10 in, 179.07 cm)    Race :      Languages :   English   Ethnicity :   Not  or    Larissa Rae CMA - 7/28/2020 1:26 PM CDT   Health Status   Allergies Verified? :   Yes   Medication History Verified? :   Yes   Medical History Verified? :   Yes   Pre-Visit Planning Status :   Completed   Tobacco Use? :   Former smoker   Hospitalized since last visit? :   Yes   Inpatient? :   Yes   Date of Discharge :   7/24/2020 CDT   Follow-up visit date :   7/28/2020 CDT   Discharge/Transition Plan Provided? :   Yes   Med List Reconciled? :   Yes   Larissa Rae CMA - 7/28/2020 1:26 PM CDT   ID Risk Screen   Recent Travel History :   No recent travel   Family Member Travel History :   No recent travel   Other Exposure to Infectious Disease :   Unknown   Larissa Rae CMA - 7/28/2020 1:26 PM CDT   Social History   Social History   (As Of: 7/28/2020 1:31:26 PM CDT)   Alcohol:        Past   (Last Updated: 4/11/2017 8:33:16 AM CDT by Phoebe Mckeon)          Tobacco:        Past, Cigarettes, 10 per day.  30 year(s).  Total pack years: 50.   (Last Updated: 12/12/2012 12:11:24 PM CST by Ru Small RN)          Employment/School:        Retired   (Last Updated: 9/26/2012 1:11:22 PM CDT by Larissa Rae CMA)          Home/Environment:        Marital status: .  Spouse/Partner name: Sidra.  Chris children.   Comments:  9/25/2012 11:56 AM - Barry Vuong MD: no insurance   (Last Updated: 9/26/2012 1:11:34 PM CDT by Larissa Rae CMA)          Exercise:        Exercise frequency: 5 times per week.  Exercise type: stationary bike.   (Last Updated: 9/26/2012 1:13:07 PM CDT by Larissa Rae CMA)

## 2022-02-15 NOTE — NURSING NOTE
Anticoagulation Therapy Management Entered On:  10/13/2020 3:23 PM CDT    Performed On:  10/13/2020 3:22 PM CDT by Jayshree Meredith RN               Anticoagulation Visit Assessment   Type of Visit - Anticoagulation :   Telephone   Anticoagulation Indication :   Atrial fibrillation   Anticoagulant Duration :   Undetermined   Anticoagulation Medication Verified :   Yes   Jayshree Meredith RN - 10/13/2020 3:22 PM CDT   Anticoagulation Patient Assessment Grid   Change in Alcohol Consumption :   No   Change in Diet :   No   Change in Medications :   No   Diarrhea :   No   Rectal Bleeding :   No   Signs of Clotting :   No   Signs of Warfarin Intolerance :   No   Unusual Bleeding, Bruising :   No   Upcoming Procedures :   No   Vomiting :   No   Jayshree Meredith RN - 10/13/2020 3:22 PM CDT   Patient on Warfarin :   Yes   Jayshree Meredith RN - 10/13/2020 3:22 PM CDT   Warfarin   Anticoagulant INR Goal Lower :   2    INR POC :   2.1    Anticoagulant INR Goal Upper :   3    Sunday :   1.25 mg   Monday :   1.25 mg   Tuesday :   1.25 mg   Wednesday :   1.25 mg   Thursday :   1.25 mg   Friday :   1.25 mg   Saturday :   1.25 mg   Total Dose :   8.75 mg   Warfarin Pt Reported Previous Week Dose :    Sun Mon Tues Wed Thurs Fri Sat Weekly Total Dose   Week 1 1.25 mg 1.25 mg 1.25 mg 1.25 mg 1.25 mg 1.25 mg 1.25 mg 8.75 mg   Week 2  mg  mg  mg  mg  mg  mg  mg  mg   Week 3  mg  mg  mg  mg  mg  mg  mg  mg   Week 4  mg  mg  mg  mg  mg  mg  mg  mg         Patient is taking single or multiple strength tablet(s) :   Single strength tab(s)   One Tab Strength :   5 mg tab   Sunday :   1.25 mg   Monday :   1.25 mg   Tuesday :   1.25 mg   Wednesday :   1.25 mg   Thursday :   1.25 mg   Friday :   1.25 mg   Saturday :   1.25 mg   Week 1 Total Dose :   8.75 mg   Sunday :   0.25 tab(s)   Monday :   0.25 tab(s)   Tuesday :   0.25 tab(s)   Wednesday :   0.25 tab(s)   Thursday :   0.25 tab(s)   Friday :   0.25 tab(s)   Saturday :   0.25 tab(s)   Patient  Instructions :   [facility] INR = 2.1  Continue warfarin 1.25mg daily. Recheck 2 weeks.  Per protocol, Faxed to tin Meredith RN, Jayshree RAMÍREZ - 10/13/2020 3:22 PM CDT

## 2022-02-15 NOTE — NURSING NOTE
Anticoagulation Therapy Management Entered On:  8/6/2020 6:49 PM CDT    Performed On:  8/6/2020 6:45 PM CDT by Geri Valdez RN               Anticoagulation Visit Assessment   Anticoagulation Indication :   Atrial fibrillation   Geri Valdez RN - 8/6/2020 6:45 PM CDT   Anticoagulation Patient Assessment Grid   Change in Alcohol Consumption :   No   Change in Diet :   No   Change in Medications :   No   Diarrhea :   No   Rectal Bleeding :   No   Signs of Clotting :   No   Signs of Warfarin Intolerance :   No   Unusual Bleeding, Bruising :   No   Upcoming Procedures :   No   Vomiting :   No   Geri Valdez RN - 8/6/2020 6:45 PM CDT   Patient on Warfarin :   Yes   Geri Valdez RN - 8/6/2020 6:45 PM CDT   Warfarin   Anticoagulant INR Goal Lower :   2    Anticoagulant INR Goal Upper :   3    Information Given by :   Other: [facility] fax on 8/6/2020   Warfarin Pt Reported Previous Week Dose :    Sun Mon Tues Wed Thurs Fri Sat Weekly Total Dose   Week 1                   Week 2                   Week 3                   Week 4                         Patient is taking single or multiple strength tablet(s) :   Single strength tab(s)   One Tab Strength :   5 mg tab   Sunday :   0 mg   Monday :   0 mg   Tuesday :   0 mg   Wednesday :   0 mg   Thursday :   0 mg   Friday :   0 mg   Saturday :   0 mg   Week 1 Total Dose :   0 mg   Sunday :   0 tab(s)   Monday :   0 tab(s)   Tuesday :   0 tab(s)   Wednesday :   0 tab(s)   Thursday :   0 tab(s)   Friday :   0 tab(s)   Saturday :   0 tab(s)   Patient Instructions :   INR = 4.7 on 8/5/20 per [facility] fax received today on 8/6/20  Patient is to continue to hold warfarin and recheck INR on 8/7/20. Directions faxed to [facility] with confirmation.     Geri Valdez RN - 8/6/2020 6:45 PM CDT

## 2022-02-15 NOTE — NURSING NOTE
Comprehensive Intake Entered On:  12/9/2020 11:02 AM CST    Performed On:  12/9/2020 10:59 AM CST by Larissa Rae CMA               Summary   Weight Measured :   167 lb(Converted to: 167 lb 0 oz, 75.750 kg)    Larissa Rae CMA - 12/9/2020 11:02 AM CST   Chief Complaint :   f/u CKD   Height/Length Estimated :   70.5 in(Converted to: 5 ft 10 in, 179.07 cm)    Systolic Blood Pressure :   120 mmHg   Diastolic Blood Pressure :   60 mmHg   Mean Arterial Pressure :   80 mmHg   Peripheral Pulse Rate :   63 bpm   Temperature Tympanic :   97.4 DegF(Converted to: 36.3 DegC)  (LOW)    Oxygen Saturation :   96 %   Race :      Languages :   English   Ethnicity :   Not  or    Larissa Rae CMA - 12/9/2020 10:59 AM CST   ID Risk Screen   Recent Travel History :   No recent travel   Family Member Travel History :   No recent travel   Other Exposure to Infectious Disease :   Unknown   Larissa Rae CMA - 12/9/2020 10:59 AM CST   Social History   Social History   (As Of: 12/9/2020 11:02:04 AM CST)   Alcohol:        Past   (Last Updated: 4/11/2017 8:33:16 AM CDT by Phoebe Mckeon)          Tobacco:        Past, Cigarettes, 10 per day.  30 year(s).  Total pack years: 50.   (Last Updated: 12/12/2012 12:11:24 PM CST by Ru Small RN)   Former smoker, quit more than 30 days ago   (Last Updated: 12/9/2020 11:00:19 AM CST by Larissa Rae CMA)          Electronic Cigarette/Vaping:        Electronic Cigarette Use: Never.   (Last Updated: 12/9/2020 11:00:15 AM CST by Larissa Rae CMA)          Employment/School:        Retired   (Last Updated: 9/26/2012 1:11:22 PM CDT by Larissa Rae CMA)          Home/Environment:        Marital status: .  Spouse/Partner name: Sidra.  0 children.   Comments:  9/25/2012 11:56 AM - Barry Vuong MD: no insurance   (Last Updated: 9/26/2012 1:11:34 PM CDT by Larissa Rae CMA)          Exercise:        Exercise frequency: 5 times per week.  Exercise type: stationary bike.    (Last Updated: 9/26/2012 1:13:07 PM CDT by Larissa Rae CMA)

## 2022-02-15 NOTE — PROGRESS NOTES
Patient:   BEHRENS, LARRY L            MRN: [id number]            FIN: [id number]               Age:   68 years     Sex:  Male     :  1950   Associated Diagnoses:   Cerebrovascular Accident (Stroke); HTN (Hypertension); CKD (chronic kidney disease) stage 4, GFR 15-29 ml/min   Author:   Chuck Anderson MD      Visit Information      Date of Service: 2018 01:14 pm  Performing Location: [facility]  Encounter#: [id number]      Primary Care Provider (PCP):  Chukc Anderson MD    NPI# [id number]      Referring Provider:  Chuck Anderson MD, NPI# [id number]   Accompanied by:  Spouse.    Source of history:  Self, Spouse.       Chief Complaint   2018 1:19 PM CDT     CKD4 lab. Wife is concerned about increased leg weakness and SOB.              Additional Information:No additional information recorded during visit.   Chief complaint and symptoms as noted above and confirmed with patient      History of Present Illness   10/14/2013: Mr. Behrens is a 62 yo referred by Dr. Vance  to renal clinic for evaluation of CKD, stage IV.  He has previously beened evaluated by Dr. Dockery via [facility] nephrology group, last seen in .  He has previously undergone extensive renal work-up including renal U/S showing no evidence of macrovascular disease and extensive intraparenchymal disease.  Previous urine immunofization studies negative.  Serological work-up previously negative.  His most recent SCr was 3.3 in August, eGFR ~18mL/min.  He and his wife say they've had some discussion with previous nephrologist about renal replacement therapy, though still unclear about what option is best for them.  Denies any uremic symptoms.  His peripheral edema has been well controlled on metolazone as diuretic monotherapy.  Blood pressure well controlled.  Currently without insurance and working on both coverage options.     10/13/2017: Presents with 4 day history of unilateral left-sided lower extremity swelling and some  noticed purpuric features.  His wife is concerned about him having cellulitis.  The leg does not hurt.  No recent injury.  No history of venous thromboembolism.  Remote history of stroke with left-sided hemiparesis.    3/30/2018: Juan returns for follow-up related to his chronic kidney disease and hypertension.  They generally padded fairly uneventful winter.  No acute health events or hospitalizations.  Generally feeling well.  Tolerating medications without issues.  No issues with lower extremity swelling.  They are trying to abide by a low-salt diet.    8/9/2018: Juan returns for follow-up related to his kidney disease.  Cory describes him appearing much more listless this past week.  His activity level is down substantially from where he had been when he has been having home therapy.  Now not getting up much.  She thinks he looks a little bit more labored with his breathing.  There is usual has no specific complaints or concerns.  Sidra has not noticed any other real localizing symptoms         Review of Systems   Constitutional:  Weakness, No fever, No chills.    Eye   Ear/Nose/Mouth/Throat:  No nasal congestion.    Respiratory:  No shortness of breath, No cough.    Cardiovascular:  No palpitations, No peripheral edema, No syncope.    Gastrointestinal:  No nausea, No vomiting, No abdominal pain.    Genitourinary:  No dysuria, No hematuria.    Hematology/Lymphatics:  Negative except as documented in history of present illness.    Endocrine:  No excessive thirst, No polyuria.    Immunologic:  No recurrent fevers.    Musculoskeletal:  No joint pain, No muscle pain.    Neurologic:  Alert and oriented X4, Confusion, No numbness, No tingling, No headache.       Health Status   Allergies:    Allergic Reactions (Selected)  Severity Not Documented  Penicillin (No reactions were documented)   Medications:  (Selected)   Prescriptions  Prescribed  Lasix 40 mg oral tablet: 1 tab(s) ( 40 mg ), PO, Daily, # 90 tab(s),  3 Refill(s), Type: Maintenance, Pharmacy: [facility], 1 tab(s) po daily  amLODIPine 10 mg oral tablet: 1 tab(s) ( 10 mg ), po, daily, # 90 tab(s), 2 Refill(s), Type: Maintenance, Pharmacy: [facility]  hydrALAZINE 100 mg oral tablet: 1 tab(s) ( 100 mg ), PO, BID, # 270 tab(s), 3 Refill(s), Type: Maintenance, Pharmacy: [facility], 1 tab(s) po bid  left foot and ankle orthosis: left foot and ankle orthosis, See Instructions, Instructions: use as directed, # 1 EA, 0 Refill(s), Type: Maintenance  melatonin 3 mg oral tablet: 1 tab(s) ( 3 mg ), po, qhs, # 90 tab(s), 3 Refill(s), Type: Maintenance, Pharmacy: [facility], 1 tab(s) po qhs  metoprolol succinate 100 mg oral tablet, extended release: 1 tab(s) ( 100 mg ), PO, Daily, # 90 tab(s), 3 Refill(s), Type: Maintenance, Pharmacy: [facility], 1 tab(s) po daily  nitroglycerin 0.4 mg sublingual tablet: 1 tab(s) ( 0.4 mg ), SL, q5min, PRN: for chest pain, # 25 tab(s), 1 Refill(s), Type: Maintenance, Pharmacy: [facility], KEEP ON FILE AND PT WILL NOTFY WHEN NEEDED, 1 tab(s) sl q5 min,PRN:for chest pain  simvastatin 20 mg oral tablet: 1 tab(s) ( 20 mg ), po, hs, # 90 tab(s), 3 Refill(s), Type: Maintenance, Pharmacy: [facility], 1 tab(s) po hs  tamsulosin 0.4 mg oral capsule: 1 cap(s) ( 0.4 mg ), PO, Daily, # 90 cap(s), 3 Refill(s), Type: Maintenance, Pharmacy: [facility], 1 cap(s) po daily  Documented Medications  Documented  Senna: po, hs, PRN: as needed for constipation, 0 Refill(s), Type: Maintenance  aspirin 325 mg oral tablet: 1 tab(s) ( 325 mg ), po, daily, 0 Refill(s), Type: Maintenance   Problem list:    All Problems  CKD (chronic kidney disease) stage 4, GFR 15-29 ml/min / SNOMED CT [id number] / Confirmed  Cerebrovascular accident (stroke) / SNOMED CT 985585679 / Confirmed  History of tobacco use / ICD-9-CM V15.82 / Probable  Hypertensive kidney disease with chronic kidney disease stage IV / SNOMED  CT 93817289 / Confirmed  Left hemiplegia / SNOMED CT 797024655 / Confirmed  Resolved: *Hospitalized@[facility] - Chest pains  Resolved: *Hospitalized@[facility] - Weakness, generalized  Resolved: Acute on chronic renal failure / SNOMED CT 0802750238  Resolved: No pertinent past medical history / ICD-9-CM V49.9      Histories   Past Medical History:    Active  Hypertensive kidney disease with chronic kidney disease stage IV (15838966)  Cerebrovascular accident (stroke) (679995780)  Left hemiplegia (344079338)  Resolved  *Hospitalized@[facility] - Chest pains: Onset on 4/6/2014 at 64 years.  Resolved on 4/7/2014 at 64 years.  *Hospitalized@[facility] - Weakness, generalized: Onset on 9/25/2012 at 62 years.  Resolved.  No pertinent past medical history (V49.9):  Resolved.  Comments:  9/25/2012 CDT 11:55 AM CDT - Barry Vuong MD  no regular health care  Acute on chronic renal failure (9624071077):  Resolved.   Family History:    Cancer  Mother  Father     Procedure history:    Appendectomy (153827435).  History of elbow surgery ([id number]).   Social History:        Alcohol Assessment            Past      Tobacco Assessment            Past, Cigarettes, 10 per day.  30 year(s).  Total pack years: 50.      Employment and Education Assessment            Retired      Home and Environment Assessment            Marital status: .  Spouse/Partner name: Sidra.  0 children.                     Comments:                      09/25/2012 - Barry Vuong MD                     no insurance      Exercise and Physical Activity Assessment            Exercise frequency: 5 times per week.  Exercise type: stationary bike.        Physical Examination   vital signs stable, as noted above   Vital Signs   8/9/2018 1:19 PM CDT Temperature Tympanic 97.6 DegF  LOW    Peripheral Pulse Rate 67 bpm    Systolic Blood Pressure 140 mmHg  HI    Diastolic Blood Pressure 72 mmHg    Mean Arterial Pressure 95 mmHg    BP Site Right arm    BP  Method Manual    Oxygen Saturation 97 %      Measurements from flowsheet : Measurements   8/9/2018 1:19 PM CDT     Height/Length Estimated   70.5 in     General:  Alert and oriented, No acute distress.    Eye:  Pupils are equal, round and reactive to light, Extraocular movements are intact.    HENT:  Normocephalic, Tympanic membranes are clear, Oral mucosa is moist.    Neck:  Supple.    Respiratory:  Lungs are clear to auscultation, Respirations are non-labored.    Cardiovascular:  Normal rate, Regular rhythm, No murmur.    Gastrointestinal:  Soft, Non-tender, Non-distended, Normal bowel sounds, No organomegaly.    Genitourinary:  No costovertebral angle tenderness.    Musculoskeletal:  Normal range of motion, Normal strength, No tenderness, left sided hemiparesis with contractures.    Neurologic:  Alert, Oriented, Normal motor function, No focal deficits, Cranial Nerves II-XII are grossly intact.    Cognition and Speech:  Oriented, Functional cognition intact, word apraxia, some dysarthric speech.    Psychiatric:  Appropriate mood & affect.       Review / Management   Results review:  Lab results   8/9/2018 2:52 PM CDT Sodium Level 140 mmol/L    Potassium Level 3.7 mmol/L    Chloride Level 107 mmol/L    CO2 Level 22 mmol/L    AGAP 11    Glucose Level 103 mg/dL    BUN 44 mg/dL    Creatinine 2.99 mg/dL    BUN/Creat Ratio 15    eGFR  25    eGFR Non-African American 21    Calcium Level 9.5 mg/dL    WBC 13.0    RBC 4.73    Hgb 13.3 g/dL    Hct 39.4 %    MCV 83 fL    MCH 28.1 pg    MCHC 33.8 g/dL    RDW 14.8 %    Platelet 177    MPV 10.4 fL    UA Color Yellow    UA Clarity Clear    UA pH 5.5    UA Specific Gravity 1.015    UA Glucose Negative mg/dL    UA Bilirubin Negative    UA Ketones Negative mg/dL    U Occult Blood Negative    UA Protein Negative mg/dL    UA Nitrite Negative    UA Leuk Est Negative    UA Urobilinogen Normal   .       Impression and Plan   Diagnosis     Cerebrovascular Accident  (Stroke) (FQK77-XO I63.50).     HTN (Hypertension) (WYU78-RF I10).     CKD (chronic kidney disease) stage 4, GFR 15-29 ml/min (TMT21-VV N18.4).         .) CKD, stage IV - likely related to microvascular disease in setting of long-standing hypertension, tobaco use.      - baseline SCr of ~2.5-3.0; eGFR ~20mL/min, stable    - CO2 fine    - proteinuria slowly rising     - consider ACEi vs. ARB in the future if proteinuria >0.5g/g    - attended pre-dialysis education classes via [facility] (6/2014). Tentative plans to begin CCPD when time to initiate comes    .) recent weakness, questionable confusion  CBC: mild leukocytosis  BMP, UA: unremarkable  - no focal symptoms or findings on exam.  Doubt pneumonia.  Benign abdomen  - no clear focus of infection    - advised to monitor for now; rtc if not improving    .) Hypertension, controlled   current antihypertensive regimen: hydralazine 100mg TID, Toprol XL 100mg daily, furosemide 40mg daily, amlodipine 10mg daily  regimen changes: none  intolerance:  future titration/work-up plan:    - SBP goal <140     .) secondary hyperparathyroidism/ hyperphosphatemia    - phosphorus levels normal without binder therapy    - vitamin D stores replete    - not an active candidate for vitamin D analogues    .) Anemia of chronic renal disease   - Hgb 13.3   - currently not on ESAs   - follow serial Hgb and iron studies as appropriate    .) CVA   - discontinued baclofen because of sedation   - working with neuro in [place]    .) health maintenance   - declines all forms of age appropriate health screening   - PSA previously elevated at 8.1 as part of prostatitis work-up; Juan does not want to pursue this any further    - no targeted symptoms to suggestive advanced spread of disease    RTC q 4 months